# Patient Record
Sex: FEMALE | Race: WHITE | Employment: FULL TIME | URBAN - METROPOLITAN AREA
[De-identification: names, ages, dates, MRNs, and addresses within clinical notes are randomized per-mention and may not be internally consistent; named-entity substitution may affect disease eponyms.]

---

## 2022-04-22 ENCOUNTER — HOSPITAL ENCOUNTER (OUTPATIENT)
Dept: GENERAL RADIOLOGY | Age: 47
Discharge: HOME | End: 2022-04-22
Attending: FAMILY MEDICINE
Payer: COMMERCIAL

## 2022-04-22 ENCOUNTER — TRANSCRIBE ORDER (OUTPATIENT)
Dept: GENERAL RADIOLOGY | Age: 47
End: 2022-04-22

## 2022-04-22 DIAGNOSIS — M54.59 OTHER LOW BACK PAIN: ICD-10-CM

## 2022-04-22 DIAGNOSIS — M54.59 OTHER LOW BACK PAIN: Primary | ICD-10-CM

## 2022-04-22 PROCEDURE — 72100 X-RAY EXAM L-S SPINE 2/3 VWS: CPT

## 2022-04-22 PROCEDURE — 72200 X-RAY EXAM SI JOINTS: CPT

## 2022-09-07 ENCOUNTER — TRANSCRIBE ORDER (OUTPATIENT)
Dept: SCHEDULING | Age: 47
End: 2022-09-07

## 2022-09-07 DIAGNOSIS — M54.59 OTHER LOW BACK PAIN: Primary | ICD-10-CM

## 2022-09-07 DIAGNOSIS — M51.36 LUMBAR DEGENERATIVE DISC DISEASE: ICD-10-CM

## 2023-04-21 DIAGNOSIS — M54.59 OTHER LOW BACK PAIN: Primary | ICD-10-CM

## 2023-04-21 DIAGNOSIS — M51.36 LUMBAR DEGENERATIVE DISC DISEASE: ICD-10-CM

## 2023-04-30 RX ORDER — ACETAMINOPHEN AND CODEINE PHOSPHATE 300; 30 MG/1; MG/1
2 TABLET ORAL EVERY 4 HOURS PRN
COMMUNITY
Start: 2014-06-17

## 2024-02-12 ENCOUNTER — HOSPITAL ENCOUNTER (OUTPATIENT)
Facility: HOSPITAL | Age: 49
Discharge: HOME | End: 2024-02-15
Attending: FAMILY MEDICINE

## 2024-02-12 DIAGNOSIS — Z13.6 ENCOUNTER FOR SCREENING FOR CARDIOVASCULAR DISORDERS: ICD-10-CM

## 2024-02-12 PROCEDURE — 75571 CT HRT W/O DYE W/CA TEST: CPT

## 2024-05-30 ENCOUNTER — HOSPITAL ENCOUNTER (OUTPATIENT)
Facility: HOSPITAL | Age: 49
Discharge: HOME | End: 2024-05-30
Attending: FAMILY MEDICINE
Payer: COMMERCIAL

## 2024-05-30 DIAGNOSIS — G44.52 NEW DAILY PERSISTENT HEADACHE (NDPH): ICD-10-CM

## 2024-05-30 PROCEDURE — 70553 MRI BRAIN STEM W/O & W/DYE: CPT

## 2024-05-30 PROCEDURE — A9579 GAD-BASE MR CONTRAST NOS,1ML: HCPCS | Performed by: RADIOLOGY

## 2024-05-30 PROCEDURE — 6360000004 HC RX CONTRAST MEDICATION: Performed by: RADIOLOGY

## 2024-05-30 RX ADMIN — GADOTERIDOL 13 ML: 279.3 INJECTION, SOLUTION INTRAVENOUS at 20:59

## 2024-06-24 ENCOUNTER — APPOINTMENT (OUTPATIENT)
Facility: HOSPITAL | Age: 49
End: 2024-06-24
Payer: COMMERCIAL

## 2024-06-24 ENCOUNTER — HOSPITAL ENCOUNTER (INPATIENT)
Facility: HOSPITAL | Age: 49
LOS: 4 days | Discharge: HOME | End: 2024-06-28
Attending: STUDENT IN AN ORGANIZED HEALTH CARE EDUCATION/TRAINING PROGRAM | Admitting: INTERNAL MEDICINE
Payer: COMMERCIAL

## 2024-06-24 DIAGNOSIS — N13.2 URETERAL STONE WITH HYDRONEPHROSIS: Primary | ICD-10-CM

## 2024-06-24 DIAGNOSIS — E86.0 DEHYDRATION: ICD-10-CM

## 2024-06-24 DIAGNOSIS — R10.9 INTRACTABLE ABDOMINAL PAIN: ICD-10-CM

## 2024-06-24 DIAGNOSIS — D72.829 LEUKOCYTOSIS, UNSPECIFIED TYPE: ICD-10-CM

## 2024-06-24 DIAGNOSIS — E87.6 HYPOKALEMIA: ICD-10-CM

## 2024-06-24 PROBLEM — N20.1 LEFT URETERAL STONE: Status: ACTIVE | Noted: 2024-06-24

## 2024-06-24 LAB
ALBUMIN SERPL-MCNC: 4 G/DL (ref 3.5–5)
ALBUMIN/GLOB SERPL: 1.1 (ref 1.1–2.2)
ALP SERPL-CCNC: 53 U/L (ref 45–117)
ALT SERPL-CCNC: 19 U/L (ref 12–78)
ANION GAP SERPL CALC-SCNC: 16 MMOL/L (ref 5–15)
AST SERPL-CCNC: 19 U/L (ref 15–37)
BASOPHILS # BLD: 0 K/UL (ref 0–0.1)
BASOPHILS NFR BLD: 0 % (ref 0–1)
BILIRUB SERPL-MCNC: 0.8 MG/DL (ref 0.2–1)
BUN SERPL-MCNC: 14 MG/DL (ref 6–20)
BUN/CREAT SERPL: 13 (ref 12–20)
CALCIUM SERPL-MCNC: 9.3 MG/DL (ref 8.5–10.1)
CHLORIDE SERPL-SCNC: 104 MMOL/L (ref 97–108)
CO2 SERPL-SCNC: 20 MMOL/L (ref 21–32)
CREAT SERPL-MCNC: 1.08 MG/DL (ref 0.55–1.02)
DIFFERENTIAL METHOD BLD: ABNORMAL
EOSINOPHIL # BLD: 0 K/UL (ref 0–0.4)
EOSINOPHIL NFR BLD: 0 % (ref 0–7)
ERYTHROCYTE [DISTWIDTH] IN BLOOD BY AUTOMATED COUNT: 12.7 % (ref 11.5–14.5)
GLOBULIN SER CALC-MCNC: 3.5 G/DL (ref 2–4)
GLUCOSE SERPL-MCNC: 128 MG/DL (ref 65–100)
HCT VFR BLD AUTO: 36.8 % (ref 35–47)
HGB BLD-MCNC: 12.5 G/DL (ref 11.5–16)
IMM GRANULOCYTES # BLD AUTO: 0 K/UL (ref 0–0.04)
IMM GRANULOCYTES NFR BLD AUTO: 0 % (ref 0–0.5)
LIPASE SERPL-CCNC: 32 U/L (ref 13–75)
LYMPHOCYTES # BLD: 0.7 K/UL (ref 0.8–3.5)
LYMPHOCYTES NFR BLD: 5 % (ref 12–49)
MAGNESIUM SERPL-MCNC: 1.6 MG/DL (ref 1.6–2.4)
MCH RBC QN AUTO: 31.6 PG (ref 26–34)
MCHC RBC AUTO-ENTMCNC: 34 G/DL (ref 30–36.5)
MCV RBC AUTO: 93.2 FL (ref 80–99)
MONOCYTES # BLD: 0.4 K/UL (ref 0–1)
MONOCYTES NFR BLD: 3 % (ref 5–13)
NEUTS SEG # BLD: 12.4 K/UL (ref 1.8–8)
NEUTS SEG NFR BLD: 92 % (ref 32–75)
NRBC # BLD: 0 K/UL (ref 0–0.01)
NRBC BLD-RTO: 0 PER 100 WBC
PLATELET # BLD AUTO: 248 K/UL (ref 150–400)
PMV BLD AUTO: 9.9 FL (ref 8.9–12.9)
POTASSIUM SERPL-SCNC: 2.8 MMOL/L (ref 3.5–5.1)
PROT SERPL-MCNC: 7.5 G/DL (ref 6.4–8.2)
RBC # BLD AUTO: 3.95 M/UL (ref 3.8–5.2)
RBC MORPH BLD: ABNORMAL
SODIUM SERPL-SCNC: 140 MMOL/L (ref 136–145)
WBC # BLD AUTO: 13.5 K/UL (ref 3.6–11)

## 2024-06-24 PROCEDURE — 85025 COMPLETE CBC W/AUTO DIFF WBC: CPT

## 2024-06-24 PROCEDURE — 2580000003 HC RX 258: Performed by: STUDENT IN AN ORGANIZED HEALTH CARE EDUCATION/TRAINING PROGRAM

## 2024-06-24 PROCEDURE — 6360000004 HC RX CONTRAST MEDICATION: Performed by: STUDENT IN AN ORGANIZED HEALTH CARE EDUCATION/TRAINING PROGRAM

## 2024-06-24 PROCEDURE — 36415 COLL VENOUS BLD VENIPUNCTURE: CPT

## 2024-06-24 PROCEDURE — 6370000000 HC RX 637 (ALT 250 FOR IP): Performed by: STUDENT IN AN ORGANIZED HEALTH CARE EDUCATION/TRAINING PROGRAM

## 2024-06-24 PROCEDURE — 81001 URINALYSIS AUTO W/SCOPE: CPT

## 2024-06-24 PROCEDURE — 83735 ASSAY OF MAGNESIUM: CPT

## 2024-06-24 PROCEDURE — 6360000002 HC RX W HCPCS: Performed by: STUDENT IN AN ORGANIZED HEALTH CARE EDUCATION/TRAINING PROGRAM

## 2024-06-24 PROCEDURE — 80053 COMPREHEN METABOLIC PANEL: CPT

## 2024-06-24 PROCEDURE — 2500000003 HC RX 250 WO HCPCS: Performed by: STUDENT IN AN ORGANIZED HEALTH CARE EDUCATION/TRAINING PROGRAM

## 2024-06-24 PROCEDURE — 83690 ASSAY OF LIPASE: CPT

## 2024-06-24 PROCEDURE — 96376 TX/PRO/DX INJ SAME DRUG ADON: CPT

## 2024-06-24 PROCEDURE — 99285 EMERGENCY DEPT VISIT HI MDM: CPT

## 2024-06-24 PROCEDURE — 96361 HYDRATE IV INFUSION ADD-ON: CPT

## 2024-06-24 PROCEDURE — 96375 TX/PRO/DX INJ NEW DRUG ADDON: CPT

## 2024-06-24 PROCEDURE — 1200000000 HC SEMI PRIVATE

## 2024-06-24 PROCEDURE — 74177 CT ABD & PELVIS W/CONTRAST: CPT

## 2024-06-24 PROCEDURE — 96374 THER/PROPH/DIAG INJ IV PUSH: CPT

## 2024-06-24 RX ORDER — HYDROMORPHONE HYDROCHLORIDE 1 MG/ML
1 INJECTION, SOLUTION INTRAMUSCULAR; INTRAVENOUS; SUBCUTANEOUS
Status: COMPLETED | OUTPATIENT
Start: 2024-06-24 | End: 2024-06-24

## 2024-06-24 RX ORDER — 0.9 % SODIUM CHLORIDE 0.9 %
1000 INTRAVENOUS SOLUTION INTRAVENOUS ONCE
Status: COMPLETED | OUTPATIENT
Start: 2024-06-24 | End: 2024-06-24

## 2024-06-24 RX ORDER — MORPHINE SULFATE 2 MG/ML
2 INJECTION, SOLUTION INTRAMUSCULAR; INTRAVENOUS
Status: DISCONTINUED | OUTPATIENT
Start: 2024-06-24 | End: 2024-06-28 | Stop reason: HOSPADM

## 2024-06-24 RX ORDER — ACETAMINOPHEN 650 MG/1
650 SUPPOSITORY RECTAL EVERY 6 HOURS PRN
Status: DISCONTINUED | OUTPATIENT
Start: 2024-06-24 | End: 2024-06-28 | Stop reason: HOSPADM

## 2024-06-24 RX ORDER — KETOROLAC TROMETHAMINE 30 MG/ML
15 INJECTION, SOLUTION INTRAMUSCULAR; INTRAVENOUS EVERY 8 HOURS PRN
Status: DISCONTINUED | OUTPATIENT
Start: 2024-06-24 | End: 2024-06-28 | Stop reason: HOSPADM

## 2024-06-24 RX ORDER — ACETAMINOPHEN 325 MG/1
650 TABLET ORAL EVERY 6 HOURS PRN
Status: DISCONTINUED | OUTPATIENT
Start: 2024-06-24 | End: 2024-06-28 | Stop reason: HOSPADM

## 2024-06-24 RX ORDER — MAGNESIUM SULFATE 1 G/100ML
1000 INJECTION INTRAVENOUS ONCE
Status: COMPLETED | OUTPATIENT
Start: 2024-06-24 | End: 2024-06-25

## 2024-06-24 RX ORDER — TAMSULOSIN HYDROCHLORIDE 0.4 MG/1
0.4 CAPSULE ORAL DAILY
Status: DISCONTINUED | OUTPATIENT
Start: 2024-06-24 | End: 2024-06-28 | Stop reason: HOSPADM

## 2024-06-24 RX ORDER — HYDROMORPHONE HYDROCHLORIDE 1 MG/ML
1 INJECTION, SOLUTION INTRAMUSCULAR; INTRAVENOUS; SUBCUTANEOUS
Status: COMPLETED | OUTPATIENT
Start: 2024-06-24 | End: 2024-06-26

## 2024-06-24 RX ORDER — POLYETHYLENE GLYCOL 3350 17 G/17G
17 POWDER, FOR SOLUTION ORAL DAILY PRN
Status: DISCONTINUED | OUTPATIENT
Start: 2024-06-24 | End: 2024-06-28 | Stop reason: HOSPADM

## 2024-06-24 RX ORDER — POTASSIUM CHLORIDE 750 MG/1
40 TABLET, FILM COATED, EXTENDED RELEASE ORAL PRN
Status: DISCONTINUED | OUTPATIENT
Start: 2024-06-24 | End: 2024-06-25

## 2024-06-24 RX ORDER — SODIUM CHLORIDE 9 MG/ML
INJECTION, SOLUTION INTRAVENOUS PRN
Status: DISCONTINUED | OUTPATIENT
Start: 2024-06-24 | End: 2024-06-28 | Stop reason: HOSPADM

## 2024-06-24 RX ORDER — SODIUM CHLORIDE 0.9 % (FLUSH) 0.9 %
5-40 SYRINGE (ML) INJECTION EVERY 12 HOURS SCHEDULED
Status: DISCONTINUED | OUTPATIENT
Start: 2024-06-24 | End: 2024-06-28 | Stop reason: HOSPADM

## 2024-06-24 RX ORDER — HYDROMORPHONE HYDROCHLORIDE 1 MG/ML
1 INJECTION, SOLUTION INTRAMUSCULAR; INTRAVENOUS; SUBCUTANEOUS
Status: DISCONTINUED | OUTPATIENT
Start: 2024-06-24 | End: 2024-06-24 | Stop reason: SDUPTHER

## 2024-06-24 RX ORDER — MAGNESIUM SULFATE IN WATER 40 MG/ML
2000 INJECTION, SOLUTION INTRAVENOUS PRN
Status: DISCONTINUED | OUTPATIENT
Start: 2024-06-24 | End: 2024-06-25

## 2024-06-24 RX ORDER — SODIUM CHLORIDE 0.9 % (FLUSH) 0.9 %
5-40 SYRINGE (ML) INJECTION PRN
Status: DISCONTINUED | OUTPATIENT
Start: 2024-06-24 | End: 2024-06-28 | Stop reason: HOSPADM

## 2024-06-24 RX ORDER — ONDANSETRON 4 MG/1
4 TABLET, ORALLY DISINTEGRATING ORAL EVERY 8 HOURS PRN
Status: DISCONTINUED | OUTPATIENT
Start: 2024-06-24 | End: 2024-06-28 | Stop reason: HOSPADM

## 2024-06-24 RX ORDER — ONDANSETRON 2 MG/ML
4 INJECTION INTRAMUSCULAR; INTRAVENOUS
Status: COMPLETED | OUTPATIENT
Start: 2024-06-24 | End: 2024-06-24

## 2024-06-24 RX ORDER — ONDANSETRON 2 MG/ML
4 INJECTION INTRAMUSCULAR; INTRAVENOUS EVERY 6 HOURS PRN
Status: DISCONTINUED | OUTPATIENT
Start: 2024-06-24 | End: 2024-06-28 | Stop reason: HOSPADM

## 2024-06-24 RX ORDER — KETOROLAC TROMETHAMINE 30 MG/ML
15 INJECTION, SOLUTION INTRAMUSCULAR; INTRAVENOUS
Status: COMPLETED | OUTPATIENT
Start: 2024-06-24 | End: 2024-06-24

## 2024-06-24 RX ORDER — POTASSIUM CHLORIDE 750 MG/1
40 TABLET, FILM COATED, EXTENDED RELEASE ORAL ONCE
Status: COMPLETED | OUTPATIENT
Start: 2024-06-24 | End: 2024-06-24

## 2024-06-24 RX ORDER — NALOXONE HYDROCHLORIDE 0.4 MG/ML
0.4 INJECTION, SOLUTION INTRAMUSCULAR; INTRAVENOUS; SUBCUTANEOUS PRN
Status: DISCONTINUED | OUTPATIENT
Start: 2024-06-24 | End: 2024-06-28 | Stop reason: HOSPADM

## 2024-06-24 RX ORDER — OXYCODONE HYDROCHLORIDE 5 MG/1
5 TABLET ORAL EVERY 4 HOURS PRN
Status: DISCONTINUED | OUTPATIENT
Start: 2024-06-24 | End: 2024-06-28 | Stop reason: HOSPADM

## 2024-06-24 RX ORDER — POTASSIUM CHLORIDE 7.45 MG/ML
10 INJECTION INTRAVENOUS PRN
Status: DISCONTINUED | OUTPATIENT
Start: 2024-06-24 | End: 2024-06-25

## 2024-06-24 RX ORDER — SODIUM CHLORIDE 9 MG/ML
INJECTION, SOLUTION INTRAVENOUS CONTINUOUS
Status: DISPENSED | OUTPATIENT
Start: 2024-06-24 | End: 2024-06-26

## 2024-06-24 RX ORDER — ONDANSETRON 2 MG/ML
4 INJECTION INTRAMUSCULAR; INTRAVENOUS EVERY 6 HOURS PRN
Status: DISCONTINUED | OUTPATIENT
Start: 2024-06-24 | End: 2024-06-24 | Stop reason: SDUPTHER

## 2024-06-24 RX ADMIN — HYDROMORPHONE HYDROCHLORIDE 1 MG: 1 INJECTION, SOLUTION INTRAMUSCULAR; INTRAVENOUS; SUBCUTANEOUS at 23:15

## 2024-06-24 RX ADMIN — POTASSIUM CHLORIDE 40 MEQ: 750 TABLET, FILM COATED, EXTENDED RELEASE ORAL at 23:15

## 2024-06-24 RX ADMIN — WATER 1000 MG: 1 INJECTION INTRAMUSCULAR; INTRAVENOUS; SUBCUTANEOUS at 23:14

## 2024-06-24 RX ADMIN — POTASSIUM CHLORIDE 40 MEQ: 750 TABLET, FILM COATED, EXTENDED RELEASE ORAL at 15:54

## 2024-06-24 RX ADMIN — MAGNESIUM SULFATE HEPTAHYDRATE 1000 MG: 1 INJECTION, SOLUTION INTRAVENOUS at 23:14

## 2024-06-24 RX ADMIN — SODIUM CHLORIDE 1000 ML: 9 INJECTION, SOLUTION INTRAVENOUS at 13:55

## 2024-06-24 RX ADMIN — ONDANSETRON 4 MG: 2 INJECTION INTRAMUSCULAR; INTRAVENOUS at 13:57

## 2024-06-24 RX ADMIN — KETOROLAC TROMETHAMINE 15 MG: 30 INJECTION, SOLUTION INTRAMUSCULAR at 13:57

## 2024-06-24 RX ADMIN — SODIUM CHLORIDE, PRESERVATIVE FREE 10 ML: 5 INJECTION INTRAVENOUS at 21:00

## 2024-06-24 RX ADMIN — HYDROMORPHONE HYDROCHLORIDE 1 MG: 1 INJECTION, SOLUTION INTRAMUSCULAR; INTRAVENOUS; SUBCUTANEOUS at 15:54

## 2024-06-24 RX ADMIN — IOPAMIDOL 100 ML: 755 INJECTION, SOLUTION INTRAVENOUS at 14:44

## 2024-06-24 RX ADMIN — HYDROMORPHONE HYDROCHLORIDE 1 MG: 1 INJECTION, SOLUTION INTRAMUSCULAR; INTRAVENOUS; SUBCUTANEOUS at 13:57

## 2024-06-24 RX ADMIN — SODIUM CHLORIDE 5 ML/HR: 9 INJECTION, SOLUTION INTRAVENOUS at 23:12

## 2024-06-24 ASSESSMENT — PAIN SCALES - GENERAL
PAINLEVEL_OUTOF10: 9
PAINLEVEL_OUTOF10: 10
PAINLEVEL_OUTOF10: 3
PAINLEVEL_OUTOF10: 6
PAINLEVEL_OUTOF10: 7
PAINLEVEL_OUTOF10: 2

## 2024-06-24 ASSESSMENT — PAIN DESCRIPTION - DESCRIPTORS
DESCRIPTORS: SHOOTING
DESCRIPTORS: ACHING

## 2024-06-24 ASSESSMENT — PAIN DESCRIPTION - LOCATION
LOCATION: ABDOMEN

## 2024-06-24 ASSESSMENT — PAIN DESCRIPTION - ORIENTATION
ORIENTATION: LEFT;LOWER
ORIENTATION: LOWER

## 2024-06-24 NOTE — ED NOTES
TRANSFER - OUT REPORT:    Verbal report given to Soledad RN on Candace Roberts  being transferred to [facility]  for routine progression of patient care       Report consisted of patient's Situation, Background, Assessment and   Recommendations(SBAR).     Information from the following report(s) ED SBAR was reviewed with the receiving nurse.    [place] Fall Assessment:    Presents to emergency department  because of falls (Syncope, seizure, or loss of consciousness): No  Age > 70: No  Altered Mental Status, Intoxication with alcohol or substance confusion (Disorientation, impaired judgment, poor safety awaremess, or inability to follow instructions): No  Impaired Mobility: Ambulates or transfers with assistive devices or assistance; Unable to ambulate or transer.: No  Nursing Judgement: Yes          Lines:   Peripheral IV 06/24/24 Posterior;Right Hand (Active)        Opportunity for questions and clarification was provided.      Patient transported with:  DYLAN

## 2024-06-24 NOTE — ED TRIAGE NOTES
ED triage note: arrives accompanied. Reports n/v/d started last night. States this morning went to work and started feeling shooting pain in lower abdomen worse on left side and vomiting.

## 2024-06-24 NOTE — ED PROVIDER NOTES
EMERGENCY DEPARTMENT PHYSICIAN NOTE     Patient: Candace Roberts     Time of Service: 6/24/2024  1:10 PM     Chief complaint:   Chief Complaint   Patient presents with    Abdominal Pain        HISTORY:  Patient is a 48 y.o. female who presents to the emergency department with complaints of severe nausea vomiting and left-sided abdominal pain that started last night.  Patient in acute distress and very ill-appearing.  Diaphoretic.  Patient reports history of kidney stones when she was younger but nothing like this pain.  IV pain medication ordered labs CT scan and urinalysis.  Patient afebrile at this time, denies dysuria.      Past Medical History:   Diagnosis Date    Cancer (HCC)     melanoma left lower leg Dx 5/14        Past Surgical History:   Procedure Laterality Date    HEENT      wisdom teeth, lasix        No family history on file.     Social History     Socioeconomic History    Marital status:    Tobacco Use    Smoking status: Never   Substance and Sexual Activity    Alcohol use: Yes        Current Medications: Reviewed in chart.    Allergies:   Allergies   Allergen Reactions    Sulfa Antibiotics Rash          REVIEW OF SYSTEMS: See HPI for pertinent positives and negatives.      PHYSICAL EXAM:  /74   Pulse 75   Temp 97 °F (36.1 °C) (Tympanic)   Resp 22   Ht 1.676 m (5' 6\")   Wt 63.5 kg (140 lb)   LMP 06/24/2024   SpO2 100%   BMI 22.60 kg/m²    Physical Exam  Vitals and nursing note reviewed.   Constitutional:       General: She is in acute distress.      Appearance: Normal appearance. She is ill-appearing.   HENT:      Head: Normocephalic and atraumatic.      Right Ear: External ear normal.      Left Ear: External ear normal.      Nose: Nose normal.      Mouth/Throat:      Mouth: Mucous membranes are moist.   Eyes:      Extraocular Movements: Extraocular movements intact.      Conjunctiva/sclera: Conjunctivae normal.   Cardiovascular:      Rate and Rhythm: Normal rate and regular

## 2024-06-24 NOTE — PROGRESS NOTES
Spiritual Care Assessment/Progress Note  [facility]    Name: Candace Roberts MRN: [id number]    Age: 48 y.o.     Sex: female   Language: English     Date: 6/24/2024            Total Time Calculated: 10 min              Spiritual Assessment begun in [facility]  Service Provided For: Patient  Referral/Consult From: Rounding  Encounter Overview/Reason: Initial Encounter    Spiritual beliefs:      [x] Involved in a marco antonio tradition/spiritual practice: Sabianist     [x] Supported by a marco antonio community: Yazdanism     [] Claims no spiritual orientation:      [] Seeking spiritual identity:           [] Adheres to an individual form of spirituality:      [] Not able to assess:                Identified resources for coping and support system:   Support System: Children, Spouse       [] Prayer                  [] Devotional reading               [] Music                  [] Guided Imagery     [] Pet visits                                        [] Other: (COMMENT)     Specific area/focus of visit   Encounter:    Crisis:    Spiritual/Emotional needs:    Ritual, Rites and Sacraments:    Grief, Loss, and Adjustments:    Ethics/Mediation:    Behavioral Health:    Palliative Care:    Advance Care Planning:      I visited Candace Roberts for an initial spiritual health assessment.   Her daughter was with her. They were both waiting to to hear what her doctor would say and wether she would be able to return home.   No immediate spiritual health needs.   Chaplain Francisco, Joanie, MS, BCC

## 2024-06-25 LAB
ANION GAP SERPL CALC-SCNC: 2 MMOL/L (ref 5–15)
APPEARANCE UR: CLEAR
BACTERIA URNS QL MICRO: NEGATIVE /HPF
BASOPHILS # BLD: 0 K/UL (ref 0–0.1)
BASOPHILS NFR BLD: 0 % (ref 0–1)
BILIRUB UR QL: NEGATIVE
BUN SERPL-MCNC: 14 MG/DL (ref 6–20)
BUN/CREAT SERPL: 11 (ref 12–20)
CALCIUM SERPL-MCNC: 8.2 MG/DL (ref 8.5–10.1)
CHLORIDE SERPL-SCNC: 113 MMOL/L (ref 97–108)
CO2 SERPL-SCNC: 23 MMOL/L (ref 21–32)
COLOR UR: ABNORMAL
CREAT SERPL-MCNC: 1.27 MG/DL (ref 0.55–1.02)
DIFFERENTIAL METHOD BLD: ABNORMAL
EOSINOPHIL # BLD: 0.1 K/UL (ref 0–0.4)
EOSINOPHIL NFR BLD: 1 % (ref 0–7)
EPITH CASTS URNS QL MICRO: ABNORMAL /LPF
ERYTHROCYTE [DISTWIDTH] IN BLOOD BY AUTOMATED COUNT: 13.3 % (ref 11.5–14.5)
GLUCOSE SERPL-MCNC: 88 MG/DL (ref 65–100)
GLUCOSE UR STRIP.AUTO-MCNC: NEGATIVE MG/DL
HCT VFR BLD AUTO: 37.1 % (ref 35–47)
HGB BLD-MCNC: 12.5 G/DL (ref 11.5–16)
HGB UR QL STRIP: ABNORMAL
IMM GRANULOCYTES # BLD AUTO: 0.1 K/UL (ref 0–0.04)
IMM GRANULOCYTES NFR BLD AUTO: 1 % (ref 0–0.5)
KETONES UR QL STRIP.AUTO: 40 MG/DL
LEUKOCYTE ESTERASE UR QL STRIP.AUTO: NEGATIVE
LYMPHOCYTES # BLD: 1.9 K/UL (ref 0.8–3.5)
LYMPHOCYTES NFR BLD: 14 % (ref 12–49)
MCH RBC QN AUTO: 32.5 PG (ref 26–34)
MCHC RBC AUTO-ENTMCNC: 33.7 G/DL (ref 30–36.5)
MCV RBC AUTO: 96.4 FL (ref 80–99)
MONOCYTES # BLD: 0.9 K/UL (ref 0–1)
MONOCYTES NFR BLD: 7 % (ref 5–13)
MUCOUS THREADS URNS QL MICRO: ABNORMAL /LPF
NEUTS SEG # BLD: 10.6 K/UL (ref 1.8–8)
NEUTS SEG NFR BLD: 77 % (ref 32–75)
NITRITE UR QL STRIP.AUTO: NEGATIVE
NRBC # BLD: 0 K/UL (ref 0–0.01)
NRBC BLD-RTO: 0 PER 100 WBC
PH UR STRIP: 5.5 (ref 5–8)
PLATELET # BLD AUTO: 235 K/UL (ref 150–400)
PMV BLD AUTO: 9.8 FL (ref 8.9–12.9)
POTASSIUM SERPL-SCNC: 4.6 MMOL/L (ref 3.5–5.1)
PROT UR STRIP-MCNC: ABNORMAL MG/DL
RBC # BLD AUTO: 3.85 M/UL (ref 3.8–5.2)
RBC #/AREA URNS HPF: ABNORMAL /HPF (ref 0–5)
SODIUM SERPL-SCNC: 138 MMOL/L (ref 136–145)
SP GR UR REFRACTOMETRY: 1.02 (ref 1–1.03)
SPECIMEN HOLD: NORMAL
UROBILINOGEN UR QL STRIP.AUTO: 0.2 EU/DL (ref 0.2–1)
WBC # BLD AUTO: 13.6 K/UL (ref 3.6–11)
WBC URNS QL MICRO: ABNORMAL /HPF (ref 0–4)

## 2024-06-25 PROCEDURE — 6370000000 HC RX 637 (ALT 250 FOR IP): Performed by: STUDENT IN AN ORGANIZED HEALTH CARE EDUCATION/TRAINING PROGRAM

## 2024-06-25 PROCEDURE — 85025 COMPLETE CBC W/AUTO DIFF WBC: CPT

## 2024-06-25 PROCEDURE — 6360000002 HC RX W HCPCS: Performed by: STUDENT IN AN ORGANIZED HEALTH CARE EDUCATION/TRAINING PROGRAM

## 2024-06-25 PROCEDURE — 1200000000 HC SEMI PRIVATE

## 2024-06-25 PROCEDURE — 2580000003 HC RX 258: Performed by: STUDENT IN AN ORGANIZED HEALTH CARE EDUCATION/TRAINING PROGRAM

## 2024-06-25 PROCEDURE — 80048 BASIC METABOLIC PNL TOTAL CA: CPT

## 2024-06-25 PROCEDURE — 36415 COLL VENOUS BLD VENIPUNCTURE: CPT

## 2024-06-25 RX ADMIN — HYDROMORPHONE HYDROCHLORIDE 1 MG: 1 INJECTION, SOLUTION INTRAMUSCULAR; INTRAVENOUS; SUBCUTANEOUS at 21:16

## 2024-06-25 RX ADMIN — ONDANSETRON 4 MG: 2 INJECTION INTRAMUSCULAR; INTRAVENOUS at 13:26

## 2024-06-25 RX ADMIN — TAMSULOSIN HYDROCHLORIDE 0.4 MG: 0.4 CAPSULE ORAL at 08:03

## 2024-06-25 RX ADMIN — MORPHINE SULFATE 2 MG: 2 INJECTION, SOLUTION INTRAMUSCULAR; INTRAVENOUS at 11:20

## 2024-06-25 RX ADMIN — TAMSULOSIN HYDROCHLORIDE 0.4 MG: 0.4 CAPSULE ORAL at 01:52

## 2024-06-25 RX ADMIN — WATER 1000 MG: 1 INJECTION INTRAMUSCULAR; INTRAVENOUS; SUBCUTANEOUS at 22:44

## 2024-06-25 RX ADMIN — SODIUM CHLORIDE: 9 INJECTION, SOLUTION INTRAVENOUS at 01:51

## 2024-06-25 RX ADMIN — SODIUM CHLORIDE: 9 INJECTION, SOLUTION INTRAVENOUS at 09:48

## 2024-06-25 RX ADMIN — SODIUM CHLORIDE, PRESERVATIVE FREE 10 ML: 5 INJECTION INTRAVENOUS at 21:15

## 2024-06-25 RX ADMIN — KETOROLAC TROMETHAMINE 15 MG: 30 INJECTION, SOLUTION INTRAMUSCULAR at 13:25

## 2024-06-25 RX ADMIN — KETOROLAC TROMETHAMINE 15 MG: 30 INJECTION, SOLUTION INTRAMUSCULAR at 21:14

## 2024-06-25 ASSESSMENT — PAIN SCALES - GENERAL
PAINLEVEL_OUTOF10: 5
PAINLEVEL_OUTOF10: 7
PAINLEVEL_OUTOF10: 2
PAINLEVEL_OUTOF10: 7

## 2024-06-25 ASSESSMENT — PAIN DESCRIPTION - LOCATION
LOCATION: ABDOMEN
LOCATION: FLANK;ABDOMEN
LOCATION: ABDOMEN

## 2024-06-25 ASSESSMENT — PAIN DESCRIPTION - ORIENTATION
ORIENTATION: LEFT
ORIENTATION: LEFT
ORIENTATION: LEFT;LOWER

## 2024-06-25 ASSESSMENT — PAIN DESCRIPTION - DESCRIPTORS: DESCRIPTORS: ACHING

## 2024-06-25 NOTE — PLAN OF CARE
Problem: Discharge Planning  Goal: Discharge to home or other facility with appropriate resources  6/25/2024 1531 by Daria Osei, RN  Outcome: Progressing  6/25/2024 0225 by Wen Chi, RN  Outcome: Progressing     Problem: Pain  Goal: Verbalizes/displays adequate comfort level or baseline comfort level  6/25/2024 1531 by Daria Osei RN  Outcome: Progressing  6/25/2024 0225 by Wen Chi RN  Outcome: Progressing

## 2024-06-25 NOTE — H&P
History & Physical    Primary Care Provider: Twyla Nova MD  Source of Information: Patient and chart review    History of Presenting Illness:   Candace Roberts is a 48 y.o. female with past medical history of left lower extremity melanoma who presented to [facility] emergency department with complaints of nausea, vomiting left middle quadrant and flank pain.  Had been in her usual state of health until about 2 days ago when she experienced symptoms.  She admits to previous episode of kidney stone with 2 mm stone which she passed.  She reports marked improvement at time of my evaluation and denies any symptoms.  The patient denies any fever, chills, chest or abdominal pain, nausea, vomiting, cough, congestion, recent illness, palpitations, or dysuria.    Remarkable vitals on ER Presentation: vss  Labs Remarkable for: WBC 13.5, potassium 2.8  ER Images: CT abdomen pelvis: Mild left-sided hydronephrosis due to 5 mm stone distal left ureter 2. Bilateral nonobstructing renal stones   ER Rx: dilaudid, toradol, zofran, -40meq, 1l ns bolus     Review of Systems:  Pertinent items are noted in the History of Present Illness.     Past Medical History:   Diagnosis Date    Cancer (HCC)     melanoma left lower leg Dx 5/14      Past Surgical History:   Procedure Laterality Date    HEENT      wisdom teeth, lasix     Prior to Admission medications    Medication Sig Start Date End Date Taking? Authorizing Provider   SPIRONOLACTONE PO Take by mouth   Yes Provider, MD Tanna   acetaminophen-codeine (TYLENOL #3) 300-30 MG per tablet Take 2 tablets by mouth every 4 hours as needed. Max Daily Amount: 12 tablets 6/17/14   Automatic Reconciliation, Ar     Allergies   Allergen Reactions    Sulfa Antibiotics Rash      No family history on file.     SOCIAL HISTORY:  Patient resides:  Independently x   Assisted Living    SNF    With family care       Smoking history:   None x   Former    Chronic      Alcohol history:   None x  U/L    AST 19 15 - 37 U/L    Alk Phosphatase 53 45 - 117 U/L    Total Protein 7.5 6.4 - 8.2 g/dL    Albumin 4.0 3.5 - 5.0 g/dL    Globulin 3.5 2.0 - 4.0 g/dL    Albumin/Globulin Ratio 1.1 1.1 - 2.2     Lipase    Collection Time: 06/24/24  1:50 PM   Result Value Ref Range    Lipase 32 13 - 75 U/L   Magnesium    Collection Time: 06/24/24  1:50 PM   Result Value Ref Range    Magnesium 1.6 1.6 - 2.4 mg/dL   CBC with Auto Differential    Collection Time: 06/24/24  2:21 PM   Result Value Ref Range    WBC 13.5 (H) 3.6 - 11.0 K/uL    RBC 3.95 3.80 - 5.20 M/uL    Hemoglobin 12.5 11.5 - 16.0 g/dL    Hematocrit 36.8 35.0 - 47.0 %    MCV 93.2 80.0 - 99.0 FL    MCH 31.6 26.0 - 34.0 PG    MCHC 34.0 30.0 - 36.5 g/dL    RDW 12.7 11.5 - 14.5 %    Platelets 248 150 - 400 K/uL    MPV 9.9 8.9 - 12.9 FL    Nucleated RBCs 0.0 0  WBC    nRBC 0.00 0.00 - 0.01 K/uL    Neutrophils % 92 (H) 32 - 75 %    Lymphocytes % 5 (L) 12 - 49 %    Monocytes % 3 (L) 5 - 13 %    Eosinophils % 0 0 - 7 %    Basophils % 0 0 - 1 %    Immature Granulocytes % 0 0.0 - 0.5 %    Neutrophils Absolute 12.4 (H) 1.8 - 8.0 K/UL    Lymphocytes Absolute 0.7 (L) 0.8 - 3.5 K/UL    Monocytes Absolute 0.4 0.0 - 1.0 K/UL    Eosinophils Absolute 0.0 0.0 - 0.4 K/UL    Basophils Absolute 0.0 0.0 - 0.1 K/UL    Immature Granulocytes Absolute 0.0 0.00 - 0.04 K/UL    Differential Type SMEAR SCANNED      RBC Comment NORMOCYTIC, NORMOCHROMIC           Imaging:     Assessment:     Candace Roberts is a 48 y.o. female with past medical history of left lower extremity melanoma who is admitted for nephrolithiasis.       Plan:       Bilateral Nephrolithiasis  Mild left-sided hydronephrosis due to 5 mm stone distal left ureter   Bilateral nonobstructing renal stones   -renal fx stable  -start flomax  -morphine toradol, darryl prn  -mivf  -urology consult in a.m.    Leukocytosis  -likely form # above  -rocephin pending urinalysis and urine culture    Hypokalemia  -replete po and ensure

## 2024-06-25 NOTE — ACP (ADVANCE CARE PLANNING)
Advance Care Planning     Advance Care Planning Inpatient Note  Spiritual [facility] Department    Today's Date: 6/25/2024  Unit: [facility] 5E1 SURGICAL UNIT    Received request from IDT Member.  Upon review of chart and communication with care team, patient's decision making abilities are not in question.. Patient was present in the room during visit.    Goals of ACP Conversation:  Facilitate a discussion related to patient's goals of care as they align with the patient's values and beliefs.    Health Care Decision Makers:       Primary Decision Maker: Sergey Roberts - Spouse - [phone]  Summary:  Verified Healthcare Decision Maker  Updated Healthcare Decision Maker    Advance Care Planning Documents (Patient Wishes):  Healthcare Power of /Advance Directive Appointment of Health Care Agent     Assessment:  We discussed Candace Roberts current feelings/concerns and spiritual perspectives. Candace Roberts shared that she was tired - hard to sleep with a roommate.She is  with 3 daughters 13, 16, and 20 yo. She works as  and talked about needing her computer to catch up on work. Candace and her family are Adventism and members of [place].     facilitated and advance directive discussion in alignment with patients' values and beliefs. We discussed in detailed [place] law and lyons of agents. At this time, Candace desires to name her spouse, Sergey Roberts, [phone] as her Primary HCPOA. Patient record updated to reflect Candace's decision. Provided ministry of presence, empathic listening; facilitated life review/storytelling; explored spiritual, emotional, and relational needs; and cultivated a relationship of care, compassion, and support.    Interventions:  Provided education on documents for clarity and greater understanding  Discussed and provided education on state decision maker hierarchy  Encouraged ongoing ACP conversation with future decision makers and loved

## 2024-06-25 NOTE — CONSULTS
Requesting Provider: Jose L Casillas MD              Patient: Candace Roberts MRN: [id number]  SSN: [id number]    YOB: 1975  Age: 48 y.o.  Sex: female     Location: [facility]/02       Code Status: Full Code   PCP: Twyla Nova MD  - [phone]   Emergency Contact:  Primary Emergency Contact: Sergey Roberts, Venkat Phone: [phone]   Race/Gnosticist/Language: White (non-) / Voodoo / Speaks English   Payor: Payor: [facility] / Plan: [facility] A [facility] / Product Type: *No Product type* /    Prior Admission Data:         Hospitalized:  Hospital Day: 2 - Admitted 6/24/2024  1:10 PM     CONSULTANTS  IP CONSULT TO UROLOGY  IP CONSULT TO HOSPITALIST  IP CONSULT TO SPIRITUAL SERVICES   ADMISSION DIAGNOSES  [unfilled]      Assessment/Plan:       49 yo F with 5 mm distal left ureteral stone, mild obstruction     - Patient is significantly improved today. No urgent intervention indicated. Okay to eat today with ongoing trial on passage on flomax. Encouraged to push fluids, strain all urine. If patient continues to feel well she can continue trial of passage and follow up on an outpatient basis understanding return parameters vs ongoing observation with re-evaluation again in the AM.     2:40 PM addendum   Patient's pain returned requiring morphine. NPO at midnight for re-eval in the AM with possible intervention.      CC: [unfilled]   HPI: She is a 48 y.o. female with past medical history of melanoma to the left lower leg who presented with chief complaint of left-sided abdominal pain x 1 day.  The pain began abruptly and progressively worsened, severity 10 out of 10, associated nausea and vomiting.  She denies associated fevers, chills, dysuria, hematuria.  CT of the abdomen and pelvis showed a 5 mm distal left ureteral stone with mild upstream hydronephrosis, also noted bilateral nonobstructing renal stones.  Images reviewed.  Urology is consulted for kidney stone.  She reports history of kidney stone in the past  Unremarkable.  KIDNEYS: Mild left-sided hydronephrosis. Slightly delayed nephrogram. 5 mm stone  distal left ureter within the lateral pelvis. There is edema in the soft tissues  anterior to the left kidney. Nonobstructing bilateral renal stones. No  right-sided hydronephrosis. Small cyst left kidney. No further evaluation is  indicated  STOMACH: Unremarkable.  SMALL BOWEL: No dilatation or wall thickening.  COLON: No dilatation or wall thickening.  APPENDIX: Not seen  PERITONEUM: No ascites or pneumoperitoneum.  RETROPERITONEUM: No lymphadenopathy or aortic aneurysm.  REPRODUCTIVE ORGANS: Not enlarged  URINARY BLADDER: Decompressed  BONES: No destructive bone lesion.  ABDOMINAL WALL: No mass or hernia.  ADDITIONAL COMMENTS: N/A    - Impression -  1. Mild left-sided hydronephrosis due to 5 mm stone distal left ureter    2. Bilateral nonobstructing renal stones    Electronically signed by Beulah Cooper    US Result (most recent):  No results found for this or any previous visit from the past 3650 days.    Cultures   [unfilled]     Past History: (Complete 2+/3 areas)     Allergies   Allergen Reactions    Sulfa Antibiotics Rash      Current Facility-Administered Medications   Medication Dose Route Frequency    HYDROmorphone HCl PF (DILAUDID) injection 1 mg  1 mg IntraVENous Q3H PRN    tamsulosin (FLOMAX) capsule 0.4 mg  0.4 mg Oral Daily    cefTRIAXone (ROCEPHIN) 1,000 mg in sterile water 10 mL IV syringe  1,000 mg IntraVENous Q24H    ketorolac (TORADOL) injection 15 mg  15 mg IntraVENous Q8H PRN    oxyCODONE (ROXICODONE) immediate release tablet 5 mg  5 mg Oral Q4H PRN    morphine (PF) injection 2 mg  2 mg IntraVENous Q3H PRN    naloxone (NARCAN) injection 0.4 mg  0.4 mg IntraVENous PRN    sodium chloride flush 0.9 % injection 5-40 mL  5-40 mL IntraVENous 2 times per day    sodium chloride flush 0.9 % injection 5-40 mL  5-40 mL IntraVENous PRN    0.9 % sodium chloride infusion   IntraVENous PRN    potassium

## 2024-06-25 NOTE — PROGRESS NOTES
Pt urinated very minuscule white matter, but didn't feel her pain has relieved. Continue to monitor.

## 2024-06-25 NOTE — FLOWSHEET NOTE
06/24/24 1935   Handoff   Communication Given Shift Handoff   Handoff Given To Wen DEWEY   Handoff Received From Daria COCHRAN RN   Handoff Communication At bedside;Face to Face   Time Handoff Given 1935   End of Shift Check Performed Yes

## 2024-06-25 NOTE — PROGRESS NOTES
Spiritual Care Assessment/Progress Note  [facility]    Name: Candace Roberts MRN: [id number]    Age: 48 y.o.     Sex: female   Language: English     Date: 6/25/2024            Total Time Calculated: 33 min              Spiritual Assessment begun in [place] [address] SURGICAL UNIT  Service Provided For: Patient  Referral/Consult From: Multi-disciplinary team  Encounter Overview/Reason: Advance Care Planning    Spiritual beliefs:      [x] Involved in a marco antonio tradition/spiritual practice: Hinduism     [x] Supported by a marco antonio community: [facility]     [] Claims no spiritual orientation:      [] Seeking spiritual identity:           [] Adheres to an individual form of spirituality:      [] Not able to assess:                Identified resources for coping and support system:   Support System: Spouse, Children       [] Prayer                  [] Devotional reading               [] Music                  [] Guided Imagery     [] Pet visits                                        [x] Other: (Family)     Specific area/focus of visit   Encounter:    Crisis:    Spiritual/Emotional needs: Type: Spiritual Support  Ritual, Rites and Sacraments:    Grief, Loss, and Adjustments:    Ethics/Mediation:    Behavioral Health:    Palliative Care:    Advance Care Planning: Type: ACP conversation, Care Preferences Addressed         Narrative:   Referral source:  initiated visit to Candace Roberts at [facility] in [place] [address] SURGICAL UNIT. I reviewed the medical record prior to this encounter.     Spiritual Assessment:     We discussed Candace Roberts current feelings/concerns and spiritual perspectives. Candace Roberts shared that she was tired - hard to sleep with a roommate.She is  with 3 daughters 13, 16, and 18 yo. She works as  and talked about needing her computer to catch up on work. Candace and her family are Hinduism and members of [facility].     facilitated and advance  directive discussion in alignment with patients' values and beliefs. We discussed in detailed [place] law and ly[place] of agents. At this time, Candace desires to name her spouse, Sergey Roberts, [phone] as her Primary HCPOA. Patient record updated to reflect Candace's decision. Provided ministry of presence, empathic listening; facilitated life review/storytelling; explored spiritual, emotional, and relational needs; and cultivated a relationship of care, compassion, and support.     Outcome: Candace Roberts verbally expressed appreciation for today's visit and support.    Plan of Care:  advised Candace Roberts of continued spiritual support as needed.       Celine Donohue MDiv, Psychiatric   paging Service [phone]

## 2024-06-25 NOTE — PROGRESS NOTES
[facility]  Hospitalist Group                                                                                          Hospitalist Progress Note  KOJO Garcia - NP  Office Phone: [phone]        Date of Service:  2024  NAME:  Candace Roberts  :  1975  MRN:  [id number]       Admission Summary:   Candace Roberts is a 48 y.o. female with past medical history of left lower extremity melanoma who presented to [facility] emergency department with complaints of nausea, vomiting left middle quadrant and flank pain.  Had been in her usual state of health until about 2 days ago when she experienced symptoms.  She admits to previous episode of kidney stone with 2 mm stone which she passed.  She reports marked improvement at time of my evaluation and denies any symptoms.  The patient denies any fever, chills, chest or abdominal pain, nausea, vomiting, cough, congestion, recent illness, palpitations, or dysuria.     Remarkable vitals on ER Presentation: vss  Labs Remarkable for: WBC 13.5, potassium 2.8  ER Images: CT abdomen pelvis: Mild left-sided hydronephrosis due to 5 mm stone distal left ureter 2. Bilateral nonobstructing renal stones   ER Rx: dilaudid, toradol, zofran, -40meq, 1l ns bolus       Interval history / Subjective:      Patient has been encouraged to push oral hydration.  Patient just completed 700 mL of water and now starting to have right-sided abdominal pain.  Pain 5/10.  Appreciate urology. Pt tolerated regular tray.  Continue to monitor for passage of stone.  Cr 1.27  Wbc 13.6    Assessment & Plan:     Bilateral Nephrolithiasis  Mild left-sided hydronephrosis due to 5 mm stone distal left ureter   Bilateral nonobstructing renal stones   -renal fx stable  -start flomax  -morphine toradol, darryl prn  -mivf  -urology evaluated     Leukocytosis  - likely 2/2 above  - UA: poor sample with moderate epithelial cells  - rocephin pending urinalysis and urine culture

## 2024-06-25 NOTE — CARE COORDINATION
Care Management Initial Assessment       RUR: 5% Low   Readmission? No  1st IM letter given? NA  1st  letter given: NA    CM met with patient at bedside to introduce self and explain role. Patient lives with her  and 3 children (19, 16, 13 y.o.) in a 2 story home with 6 steps to enter and a full flight to enter the 2nd floor. Patient is completely independent at baseline. No history of HH or rehab. Patient does not own any DME. Patient's  will provide transport home once medically stable. CM will follow as needed.      06/25/24 1208   Service Assessment   Patient Orientation Alert and Oriented;Person;Place;Situation;Self   Cognition Alert   History Provided By Patient   Primary Caregiver Self   Accompanied By/Relationship    Support Systems Spouse/Significant Other   Patient's Healthcare Decision Maker is: Legal Next of Kin   PCP Verified by CM Yes  (Dr. Twyla Nova)   Last Visit to PCP Within last 6 months  (Last visit in Jan. 2024)   Prior Functional Level Independent in ADLs/IADLs   Current Functional Level Independent in ADLs/IADLs   Can patient return to prior living arrangement Yes   Ability to make needs known: Good   Family able to assist with home care needs: Yes   Would you like for me to discuss the discharge plan with any other family members/significant others, and if so, who? Yes  (Upon patient request)   Social/Functional History   Lives With Spouse;Son;Daughter   Type of Home House   Home Equipment None   ADL Assistance Independent   Homemaking Assistance Independent   Ambulation Assistance Independent   Transfer Assistance Independent   Discharge Planning   Type of Residence House   Living Arrangements Spouse/Significant Other;Children   Current Services Prior To Admission None   Potential Assistance Needed N/A   DME Ordered? No   Potential Assistance Purchasing Medications No   Patient expects to be discharged to: [facility] [place]   [phone]

## 2024-06-26 LAB
ANION GAP SERPL CALC-SCNC: 3 MMOL/L (ref 5–15)
BUN SERPL-MCNC: 8 MG/DL (ref 6–20)
BUN/CREAT SERPL: 8 (ref 12–20)
CALCIUM SERPL-MCNC: 7.6 MG/DL (ref 8.5–10.1)
CHLORIDE SERPL-SCNC: 117 MMOL/L (ref 97–108)
CO2 SERPL-SCNC: 16 MMOL/L (ref 21–32)
CREAT SERPL-MCNC: 0.95 MG/DL (ref 0.55–1.02)
ERYTHROCYTE [DISTWIDTH] IN BLOOD BY AUTOMATED COUNT: 13.4 % (ref 11.5–14.5)
GLUCOSE SERPL-MCNC: 83 MG/DL (ref 65–100)
HCT VFR BLD AUTO: 35.5 % (ref 35–47)
HGB BLD-MCNC: 11.4 G/DL (ref 11.5–16)
MCH RBC QN AUTO: 32.3 PG (ref 26–34)
MCHC RBC AUTO-ENTMCNC: 32.1 G/DL (ref 30–36.5)
MCV RBC AUTO: 100.6 FL (ref 80–99)
NRBC # BLD: 0 K/UL (ref 0–0.01)
NRBC BLD-RTO: 0 PER 100 WBC
PLATELET # BLD AUTO: 204 K/UL (ref 150–400)
PMV BLD AUTO: 9.6 FL (ref 8.9–12.9)
POTASSIUM SERPL-SCNC: 4.4 MMOL/L (ref 3.5–5.1)
RBC # BLD AUTO: 3.53 M/UL (ref 3.8–5.2)
SODIUM SERPL-SCNC: 136 MMOL/L (ref 136–145)
WBC # BLD AUTO: 13 K/UL (ref 3.6–11)

## 2024-06-26 PROCEDURE — 6370000000 HC RX 637 (ALT 250 FOR IP): Performed by: STUDENT IN AN ORGANIZED HEALTH CARE EDUCATION/TRAINING PROGRAM

## 2024-06-26 PROCEDURE — 36415 COLL VENOUS BLD VENIPUNCTURE: CPT

## 2024-06-26 PROCEDURE — 6360000002 HC RX W HCPCS: Performed by: STUDENT IN AN ORGANIZED HEALTH CARE EDUCATION/TRAINING PROGRAM

## 2024-06-26 PROCEDURE — 2580000003 HC RX 258: Performed by: STUDENT IN AN ORGANIZED HEALTH CARE EDUCATION/TRAINING PROGRAM

## 2024-06-26 PROCEDURE — 1200000000 HC SEMI PRIVATE

## 2024-06-26 PROCEDURE — 85027 COMPLETE CBC AUTOMATED: CPT

## 2024-06-26 PROCEDURE — 80048 BASIC METABOLIC PNL TOTAL CA: CPT

## 2024-06-26 RX ADMIN — SODIUM CHLORIDE: 9 INJECTION, SOLUTION INTRAVENOUS at 11:06

## 2024-06-26 RX ADMIN — OXYCODONE HYDROCHLORIDE 5 MG: 5 TABLET ORAL at 23:15

## 2024-06-26 RX ADMIN — SODIUM CHLORIDE: 9 INJECTION, SOLUTION INTRAVENOUS at 01:59

## 2024-06-26 RX ADMIN — KETOROLAC TROMETHAMINE 15 MG: 30 INJECTION, SOLUTION INTRAMUSCULAR at 23:15

## 2024-06-26 RX ADMIN — WATER 1000 MG: 1 INJECTION INTRAMUSCULAR; INTRAVENOUS; SUBCUTANEOUS at 23:15

## 2024-06-26 RX ADMIN — HYDROMORPHONE HYDROCHLORIDE 1 MG: 1 INJECTION, SOLUTION INTRAMUSCULAR; INTRAVENOUS; SUBCUTANEOUS at 18:30

## 2024-06-26 RX ADMIN — SODIUM CHLORIDE: 9 INJECTION, SOLUTION INTRAVENOUS at 23:15

## 2024-06-26 RX ADMIN — TAMSULOSIN HYDROCHLORIDE 0.4 MG: 0.4 CAPSULE ORAL at 09:35

## 2024-06-26 RX ADMIN — SODIUM CHLORIDE, PRESERVATIVE FREE 10 ML: 5 INJECTION INTRAVENOUS at 09:43

## 2024-06-26 ASSESSMENT — PAIN DESCRIPTION - ORIENTATION
ORIENTATION: LEFT
ORIENTATION: LEFT

## 2024-06-26 ASSESSMENT — PAIN DESCRIPTION - DESCRIPTORS
DESCRIPTORS: ACHING
DESCRIPTORS: ACHING

## 2024-06-26 ASSESSMENT — PAIN SCALES - GENERAL
PAINLEVEL_OUTOF10: 6
PAINLEVEL_OUTOF10: 0
PAINLEVEL_OUTOF10: 0
PAINLEVEL_OUTOF10: 8

## 2024-06-26 ASSESSMENT — PAIN DESCRIPTION - LOCATION
LOCATION: FLANK
LOCATION: FLANK;ABDOMEN

## 2024-06-26 NOTE — PROGRESS NOTES
Spiritual Care Assessment/Progress Note  [facility]    Name: Candace Roberts MRN: [id number]    Age: 48 y.o.     Sex: female   Language: English     Date: 6/26/2024            Total Time Calculated: 5 min              Spiritual Assessment begun in [facility] 5E1 SURGICAL UNIT  Service Provided For: Patient  Referral/Consult From: Clergy/  Encounter Overview/Reason: Rituals, Rites and Sacraments    Spiritual beliefs:      [x] Involved in a marco antonio tradition/spiritual practice: Gnosticism     [x] Supported by a marco antonio community: Rouse     [] Claims no spiritual orientation:      [] Seeking spiritual identity:           [] Adheres to an individual form of spirituality:      [] Not able to assess:                Identified resources for coping and support system:   Support System: Spouse       [x] Prayer                  [] Devotional reading               [x] Music                  [] Guided Imagery     [] Pet visits                                        [] Other: (COMMENT)     Specific area/focus of visit   Encounter:    Crisis:    Spiritual/Emotional needs: Type: Spiritual Support  Ritual, Rites and Sacraments: Type: Gnosticism Communion  Grief, Loss, and Adjustments:    Ethics/Mediation:    Behavioral Health:    Palliative Care:    Advance Care Planning: Type: ACP conversation, Care Preferences Addressed    Plan/Referrals: Continue to visit, (comment)    Narrative: Mrs. Roberts was sitting up in bed.  She had earplugs in because her roommate spoke loud because of hearing difficulty.  Mrs. Roberts is hoping to go home tomorrow. Prayer and communion offered. She expressed gratitude for this opportunity.     Sr. LEIGH Armas, RN, ACSW, LCSW   Page:  [phone]

## 2024-06-26 NOTE — PROGRESS NOTES
Patient: Candace Roberts MRN: [id number]  SSN: [id number]    YOB: 1975  Age: 48 y.o.  Sex: female        ADMITTED: 2024 to Jose L Casillas MD by Joan Abel MD for Dehydration [E86.0]  Hypokalemia [E87.6]  Intractable abdominal pain [R10.9]  Ureteral stone with hydronephrosis [N13.2]  Left ureteral stone [N20.1]  Leukocytosis, unspecified type [D72.829]  POD# * No surgery date entered * Procedure(s):  CYSTOSCOPY, LEFT RETROGRADE, LEFT URETEROSCOPY WITH LASER, AND LEFT STENT PLACEMENT    Candace Roberts is doing fair. Pain currently controlled, however, she has continued to have intermittent episodes of severe pain overnight. Denies passage of the stone.     Temp 99.5. VSS  WBC 13.0, contaminated urine sample  Cr 0.95 (1.27)      Vitals: Temp (24hrs), Av.9 °F (37.2 °C), Min:98.2 °F (36.8 °C), Max:99.5 °F (37.5 °C)    Blood pressure 110/74, pulse 73, temperature 99.5 °F (37.5 °C), temperature source Oral, resp. rate 18, height 1.676 m (5' 6\"), weight 63.5 kg (140 lb), last menstrual period 2024, SpO2 97 %.    Intake and Output:   1901 -  0700  In: -   Out: 1350 [Urine:1350]  No intake/output data recorded.  YADIRA Output lats 24 hrs: No data found.   YADIRA Output last 8 hrs: No data found.  BM over last 24 hrs: No data found.    Physical Exam  General: NAD, pleasant  Respiratory: no distress, breathing easily, room air  Abdomen: soft, no distention; LLQ tender to palpation  : no CVA tenderness, voiding independently   Neuro: Appropriate, no focal neurological deficits  Skin: warm, dry  Extremities: moves all, full ROM    Labs:  CBC:   Lab Results   Component Value Date/Time    WBC 13.0 2024 02:15 AM    HCT 35.5 2024 02:15 AM     2024 02:15 AM     BMP:   Lab Results   Component Value Date/Time     2024 02:15 AM    K 4.4 2024 02:15 AM     2024 02:15 AM    CO2 16 2024 02:15 AM    BUN 8 2024 02:15 AM       Assessment/Plan:   47 yo F with 5 mm distal left ureteral stone, mild obstruction     - Patient prefers to proceed with surgical intervention due to ongoing pain which is reasonable. Posted for cysto, left ureteroscopy with laser lithotripsy, left ureteral stent tomorrow at 3:30 PM with Dr. Pedroza, next available.  Discussed possibility of infected urine behind stone and placement of only stent if this is the case. Risks and benefits reviewed and she agrees and wishes to proceed. NPO at midnight. In the interim continue flomax, hydrate, strain all urine, empiric abx.     Signed By: KOJO Wilder - NP - June 26, 2024

## 2024-06-26 NOTE — PROGRESS NOTES
below:          General : alert x 3, awake, no acute distress,   HEENT: EOMI, moist mucus membrane  Neck: supple, no JVD,  Chest: Clear to auscultation bilaterally, RA  CVS: RRR, S1 S2 heard,  Abd: soft/ + right sided tenderness, non distended, BS physiological,   Ext: no clubbing, no cyanosis, no edema, brisk 2+ DP pulses  Neuro/Psych: pleasant mood and affect, THORPE spontaneously  Skin: warm     Data Review:    Review and/or order of clinical lab test  Review and/or order of tests in the radiology section of CPT  Review and/or order of tests in the medicine section of CPT      I have personally and independently reviewed all pertinent labs, diagnostic studies, imaging, and have provided independent interpretation of the same.     Labs:     Recent Labs     06/25/24  0506 06/26/24  0215   WBC 13.6* 13.0*   HGB 12.5 11.4*   HCT 37.1 35.5    204       Recent Labs     06/24/24  1350 06/25/24  0506 06/26/24  0215    138 136   K 2.8* 4.6 4.4    113* 117*   CO2 20* 23 16*   BUN 14 14 8   MG 1.6  --   --        Recent Labs     06/24/24  1350   ALT 19   GLOB 3.5       No results for input(s): \"INR\", \"APTT\" in the last 72 hours.    Invalid input(s): \"PTP\"   No results for input(s): \"TIBC\" in the last 72 hours.    Invalid input(s): \"FE\", \"PSAT\", \"FERR\"   No results found for: \"RBCF\"   No results for input(s): \"PH\", \"PCO2\", \"PO2\" in the last 72 hours.  No results for input(s): \"CPK\" in the last 72 hours.    Invalid input(s): \"CPKMB\", \"CKNDX\", \"TROIQ\"  No results found for: \"CHOL\", \"CHLST\", \"CHOLV\", \"HDL\", \"HDLC\", \"LDL\"  No results found for: \"GLUCPOC\"  [unfilled]    Notes reviewed from all clinical/nonclinical/nursing services involved in patient's clinical care. Care coordination discussions were held with appropriate clinical/nonclinical/ nursing providers based on care coordination needs.         Patients current active Medications were reviewed, considered, added and adjusted based on the clinical condition  today.      Home Medications were reconciled to the best of my ability given all available resources at the time of admission. Route is PO if not otherwise noted.      Admission Status:[id number]:::1}      Medications Reviewed:     Current Facility-Administered Medications   Medication Dose Route Frequency    HYDROmorphone HCl PF (DILAUDID) injection 1 mg  1 mg IntraVENous Q3H PRN    tamsulosin (FLOMAX) capsule 0.4 mg  0.4 mg Oral Daily    cefTRIAXone (ROCEPHIN) 1,000 mg in sterile water 10 mL IV syringe  1,000 mg IntraVENous Q24H    ketorolac (TORADOL) injection 15 mg  15 mg IntraVENous Q8H PRN    oxyCODONE (ROXICODONE) immediate release tablet 5 mg  5 mg Oral Q4H PRN    morphine (PF) injection 2 mg  2 mg IntraVENous Q3H PRN    naloxone (NARCAN) injection 0.4 mg  0.4 mg IntraVENous PRN    sodium chloride flush 0.9 % injection 5-40 mL  5-40 mL IntraVENous 2 times per day    sodium chloride flush 0.9 % injection 5-40 mL  5-40 mL IntraVENous PRN    0.9 % sodium chloride infusion   IntraVENous PRN    ondansetron (ZOFRAN-ODT) disintegrating tablet 4 mg  4 mg Oral Q8H PRN    Or    ondansetron (ZOFRAN) injection 4 mg  4 mg IntraVENous Q6H PRN    polyethylene glycol (GLYCOLAX) packet 17 g  17 g Oral Daily PRN    acetaminophen (TYLENOL) tablet 650 mg  650 mg Oral Q6H PRN    Or    acetaminophen (TYLENOL) suppository 650 mg  650 mg Rectal Q6H PRN    0.9 % sodium chloride infusion   IntraVENous Continuous     ______________________________________________________________________  EXPECTED LENGTH OF STAY: 3  ACTUAL LENGTH OF STAY:          2                 KOJO Garcia NP

## 2024-06-27 ENCOUNTER — APPOINTMENT (OUTPATIENT)
Facility: HOSPITAL | Age: 49
End: 2024-06-27
Payer: COMMERCIAL

## 2024-06-27 ENCOUNTER — ANESTHESIA (OUTPATIENT)
Facility: HOSPITAL | Age: 49
DRG: 661 | End: 2024-06-27
Payer: COMMERCIAL

## 2024-06-27 ENCOUNTER — ANESTHESIA EVENT (OUTPATIENT)
Facility: HOSPITAL | Age: 49
DRG: 661 | End: 2024-06-27
Payer: COMMERCIAL

## 2024-06-27 LAB
ANION GAP SERPL CALC-SCNC: 0 MMOL/L (ref 5–15)
BASOPHILS # BLD: 0.1 K/UL (ref 0–0.1)
BASOPHILS NFR BLD: 1 % (ref 0–1)
BUN SERPL-MCNC: 5 MG/DL (ref 6–20)
BUN/CREAT SERPL: 8 (ref 12–20)
CALCIUM SERPL-MCNC: 7.6 MG/DL (ref 8.5–10.1)
CHLORIDE SERPL-SCNC: 116 MMOL/L (ref 97–108)
CO2 SERPL-SCNC: 24 MMOL/L (ref 21–32)
CREAT SERPL-MCNC: 0.61 MG/DL (ref 0.55–1.02)
DIFFERENTIAL METHOD BLD: ABNORMAL
EOSINOPHIL # BLD: 0.3 K/UL (ref 0–0.4)
EOSINOPHIL NFR BLD: 3 % (ref 0–7)
ERYTHROCYTE [DISTWIDTH] IN BLOOD BY AUTOMATED COUNT: 13.1 % (ref 11.5–14.5)
GLUCOSE SERPL-MCNC: 88 MG/DL (ref 65–100)
HCT VFR BLD AUTO: 30.4 % (ref 35–47)
HGB BLD-MCNC: 10.5 G/DL (ref 11.5–16)
IMM GRANULOCYTES # BLD AUTO: 0 K/UL (ref 0–0.04)
IMM GRANULOCYTES NFR BLD AUTO: 0 % (ref 0–0.5)
LYMPHOCYTES # BLD: 1.5 K/UL (ref 0.8–3.5)
LYMPHOCYTES NFR BLD: 17 % (ref 12–49)
MCH RBC QN AUTO: 32.6 PG (ref 26–34)
MCHC RBC AUTO-ENTMCNC: 34.5 G/DL (ref 30–36.5)
MCV RBC AUTO: 94.4 FL (ref 80–99)
MONOCYTES # BLD: 0.7 K/UL (ref 0–1)
MONOCYTES NFR BLD: 8 % (ref 5–13)
NEUTS SEG # BLD: 6.4 K/UL (ref 1.8–8)
NEUTS SEG NFR BLD: 72 % (ref 32–75)
NRBC # BLD: 0 K/UL (ref 0–0.01)
NRBC BLD-RTO: 0 PER 100 WBC
PLATELET # BLD AUTO: 203 K/UL (ref 150–400)
PMV BLD AUTO: 9.7 FL (ref 8.9–12.9)
POTASSIUM SERPL-SCNC: 3.8 MMOL/L (ref 3.5–5.1)
RBC # BLD AUTO: 3.22 M/UL (ref 3.8–5.2)
SODIUM SERPL-SCNC: 140 MMOL/L (ref 136–145)
WBC # BLD AUTO: 8.9 K/UL (ref 3.6–11)

## 2024-06-27 PROCEDURE — 3700000000 HC ANESTHESIA ATTENDED CARE: Performed by: STUDENT IN AN ORGANIZED HEALTH CARE EDUCATION/TRAINING PROGRAM

## 2024-06-27 PROCEDURE — 0T748DZ DILATION OF LEFT KIDNEY PELVIS WITH INTRALUMINAL DEVICE, VIA NATURAL OR ARTIFICIAL OPENING ENDOSCOPIC: ICD-10-PCS | Performed by: STUDENT IN AN ORGANIZED HEALTH CARE EDUCATION/TRAINING PROGRAM

## 2024-06-27 PROCEDURE — 36415 COLL VENOUS BLD VENIPUNCTURE: CPT

## 2024-06-27 PROCEDURE — 6360000004 HC RX CONTRAST MEDICATION: Performed by: STUDENT IN AN ORGANIZED HEALTH CARE EDUCATION/TRAINING PROGRAM

## 2024-06-27 PROCEDURE — 2500000003 HC RX 250 WO HCPCS: Performed by: NURSE ANESTHETIST, CERTIFIED REGISTERED

## 2024-06-27 PROCEDURE — 1200000000 HC SEMI PRIVATE

## 2024-06-27 PROCEDURE — 85025 COMPLETE CBC W/AUTO DIFF WBC: CPT

## 2024-06-27 PROCEDURE — 2580000003 HC RX 258: Performed by: STUDENT IN AN ORGANIZED HEALTH CARE EDUCATION/TRAINING PROGRAM

## 2024-06-27 PROCEDURE — 6360000002 HC RX W HCPCS: Performed by: NURSE ANESTHETIST, CERTIFIED REGISTERED

## 2024-06-27 PROCEDURE — C1758 CATHETER, URETERAL: HCPCS | Performed by: STUDENT IN AN ORGANIZED HEALTH CARE EDUCATION/TRAINING PROGRAM

## 2024-06-27 PROCEDURE — C2617 STENT, NON-COR, TEM W/O DEL: HCPCS | Performed by: STUDENT IN AN ORGANIZED HEALTH CARE EDUCATION/TRAINING PROGRAM

## 2024-06-27 PROCEDURE — 7100000000 HC PACU RECOVERY - FIRST 15 MIN: Performed by: STUDENT IN AN ORGANIZED HEALTH CARE EDUCATION/TRAINING PROGRAM

## 2024-06-27 PROCEDURE — 0TF48ZZ FRAGMENTATION IN LEFT KIDNEY PELVIS, VIA NATURAL OR ARTIFICIAL OPENING ENDOSCOPIC: ICD-10-PCS | Performed by: STUDENT IN AN ORGANIZED HEALTH CARE EDUCATION/TRAINING PROGRAM

## 2024-06-27 PROCEDURE — 3700000001 HC ADD 15 MINUTES (ANESTHESIA): Performed by: STUDENT IN AN ORGANIZED HEALTH CARE EDUCATION/TRAINING PROGRAM

## 2024-06-27 PROCEDURE — C1769 GUIDE WIRE: HCPCS | Performed by: STUDENT IN AN ORGANIZED HEALTH CARE EDUCATION/TRAINING PROGRAM

## 2024-06-27 PROCEDURE — BT1F1ZZ FLUOROSCOPY OF LEFT KIDNEY, URETER AND BLADDER USING LOW OSMOLAR CONTRAST: ICD-10-PCS | Performed by: STUDENT IN AN ORGANIZED HEALTH CARE EDUCATION/TRAINING PROGRAM

## 2024-06-27 PROCEDURE — 6360000002 HC RX W HCPCS: Performed by: STUDENT IN AN ORGANIZED HEALTH CARE EDUCATION/TRAINING PROGRAM

## 2024-06-27 PROCEDURE — 6370000000 HC RX 637 (ALT 250 FOR IP): Performed by: STUDENT IN AN ORGANIZED HEALTH CARE EDUCATION/TRAINING PROGRAM

## 2024-06-27 PROCEDURE — 2580000003 HC RX 258: Performed by: NURSE ANESTHETIST, CERTIFIED REGISTERED

## 2024-06-27 PROCEDURE — 2720000010 HC SURG SUPPLY STERILE: Performed by: STUDENT IN AN ORGANIZED HEALTH CARE EDUCATION/TRAINING PROGRAM

## 2024-06-27 PROCEDURE — 3600000004 HC SURGERY LEVEL 4 BASE: Performed by: STUDENT IN AN ORGANIZED HEALTH CARE EDUCATION/TRAINING PROGRAM

## 2024-06-27 PROCEDURE — 3600000014 HC SURGERY LEVEL 4 ADDTL 15MIN: Performed by: STUDENT IN AN ORGANIZED HEALTH CARE EDUCATION/TRAINING PROGRAM

## 2024-06-27 PROCEDURE — 80048 BASIC METABOLIC PNL TOTAL CA: CPT

## 2024-06-27 PROCEDURE — 2709999900 HC NON-CHARGEABLE SUPPLY: Performed by: STUDENT IN AN ORGANIZED HEALTH CARE EDUCATION/TRAINING PROGRAM

## 2024-06-27 PROCEDURE — 7100000001 HC PACU RECOVERY - ADDTL 15 MIN: Performed by: STUDENT IN AN ORGANIZED HEALTH CARE EDUCATION/TRAINING PROGRAM

## 2024-06-27 DEVICE — URETERAL STENT
Type: IMPLANTABLE DEVICE | Site: URETER | Status: FUNCTIONAL
Brand: CONTOUR™

## 2024-06-27 RX ORDER — LIDOCAINE HYDROCHLORIDE 20 MG/ML
INJECTION, SOLUTION EPIDURAL; INFILTRATION; INTRACAUDAL; PERINEURAL PRN
Status: DISCONTINUED | OUTPATIENT
Start: 2024-06-27 | End: 2024-06-27 | Stop reason: SDUPTHER

## 2024-06-27 RX ORDER — CEFAZOLIN SODIUM 1 G/3ML
INJECTION, POWDER, FOR SOLUTION INTRAMUSCULAR; INTRAVENOUS PRN
Status: DISCONTINUED | OUTPATIENT
Start: 2024-06-27 | End: 2024-06-27 | Stop reason: SDUPTHER

## 2024-06-27 RX ORDER — ROCURONIUM BROMIDE 10 MG/ML
INJECTION, SOLUTION INTRAVENOUS PRN
Status: DISCONTINUED | OUTPATIENT
Start: 2024-06-27 | End: 2024-06-27 | Stop reason: SDUPTHER

## 2024-06-27 RX ORDER — ONDANSETRON 2 MG/ML
INJECTION INTRAMUSCULAR; INTRAVENOUS PRN
Status: DISCONTINUED | OUTPATIENT
Start: 2024-06-27 | End: 2024-06-27 | Stop reason: SDUPTHER

## 2024-06-27 RX ORDER — SODIUM CHLORIDE, SODIUM LACTATE, POTASSIUM CHLORIDE, CALCIUM CHLORIDE 600; 310; 30; 20 MG/100ML; MG/100ML; MG/100ML; MG/100ML
INJECTION, SOLUTION INTRAVENOUS CONTINUOUS PRN
Status: DISCONTINUED | OUTPATIENT
Start: 2024-06-27 | End: 2024-06-27 | Stop reason: SDUPTHER

## 2024-06-27 RX ORDER — MIDAZOLAM HYDROCHLORIDE 1 MG/ML
INJECTION INTRAMUSCULAR; INTRAVENOUS PRN
Status: DISCONTINUED | OUTPATIENT
Start: 2024-06-27 | End: 2024-06-27 | Stop reason: SDUPTHER

## 2024-06-27 RX ORDER — KETOROLAC TROMETHAMINE 30 MG/ML
INJECTION, SOLUTION INTRAMUSCULAR; INTRAVENOUS PRN
Status: DISCONTINUED | OUTPATIENT
Start: 2024-06-27 | End: 2024-06-27 | Stop reason: SDUPTHER

## 2024-06-27 RX ORDER — DEXAMETHASONE SODIUM PHOSPHATE 4 MG/ML
INJECTION, SOLUTION INTRA-ARTICULAR; INTRALESIONAL; INTRAMUSCULAR; INTRAVENOUS; SOFT TISSUE PRN
Status: DISCONTINUED | OUTPATIENT
Start: 2024-06-27 | End: 2024-06-27 | Stop reason: SDUPTHER

## 2024-06-27 RX ORDER — FENTANYL CITRATE 50 UG/ML
INJECTION, SOLUTION INTRAMUSCULAR; INTRAVENOUS PRN
Status: DISCONTINUED | OUTPATIENT
Start: 2024-06-27 | End: 2024-06-27 | Stop reason: SDUPTHER

## 2024-06-27 RX ADMIN — FENTANYL CITRATE 100 MCG: 50 INJECTION, SOLUTION INTRAMUSCULAR; INTRAVENOUS at 15:31

## 2024-06-27 RX ADMIN — KETOROLAC TROMETHAMINE 30 MG: 30 INJECTION, SOLUTION INTRAMUSCULAR; INTRAVENOUS at 16:09

## 2024-06-27 RX ADMIN — WATER 1000 MG: 1 INJECTION INTRAMUSCULAR; INTRAVENOUS; SUBCUTANEOUS at 23:04

## 2024-06-27 RX ADMIN — SODIUM CHLORIDE, PRESERVATIVE FREE 10 ML: 5 INJECTION INTRAVENOUS at 23:04

## 2024-06-27 RX ADMIN — POLYETHYLENE GLYCOL 3350 17 G: 17 POWDER, FOR SOLUTION ORAL at 23:09

## 2024-06-27 RX ADMIN — SUGAMMADEX 200 MG: 100 INJECTION, SOLUTION INTRAVENOUS at 16:08

## 2024-06-27 RX ADMIN — HYDROMORPHONE HYDROCHLORIDE 1 MG: 1 INJECTION, SOLUTION INTRAMUSCULAR; INTRAVENOUS; SUBCUTANEOUS at 15:45

## 2024-06-27 RX ADMIN — SODIUM CHLORIDE, PRESERVATIVE FREE 10 ML: 5 INJECTION INTRAVENOUS at 09:10

## 2024-06-27 RX ADMIN — ROCURONIUM BROMIDE 30 MG: 10 SOLUTION INTRAVENOUS at 15:31

## 2024-06-27 RX ADMIN — DEXAMETHASONE SODIUM PHOSPHATE 8 MG: 4 INJECTION, SOLUTION INTRAMUSCULAR; INTRAVENOUS at 15:40

## 2024-06-27 RX ADMIN — PROPOFOL 150 MG: 10 INJECTION, EMULSION INTRAVENOUS at 15:31

## 2024-06-27 RX ADMIN — CEFAZOLIN 2 G: 1 INJECTION, POWDER, FOR SOLUTION INTRAMUSCULAR; INTRAVENOUS at 15:44

## 2024-06-27 RX ADMIN — LIDOCAINE HYDROCHLORIDE 60 MG: 20 INJECTION, SOLUTION EPIDURAL; INFILTRATION; INTRACAUDAL; PERINEURAL at 15:31

## 2024-06-27 RX ADMIN — MIDAZOLAM 2 MG: 1 INJECTION INTRAMUSCULAR; INTRAVENOUS at 15:26

## 2024-06-27 RX ADMIN — ONDANSETRON 4 MG: 2 INJECTION INTRAMUSCULAR; INTRAVENOUS at 16:05

## 2024-06-27 RX ADMIN — TAMSULOSIN HYDROCHLORIDE 0.4 MG: 0.4 CAPSULE ORAL at 09:08

## 2024-06-27 RX ADMIN — SODIUM CHLORIDE, POTASSIUM CHLORIDE, SODIUM LACTATE AND CALCIUM CHLORIDE: 600; 310; 30; 20 INJECTION, SOLUTION INTRAVENOUS at 14:50

## 2024-06-27 RX ADMIN — SODIUM CHLORIDE 10 ML/HR: 9 INJECTION, SOLUTION INTRAVENOUS at 17:31

## 2024-06-27 ASSESSMENT — PAIN SCALES - GENERAL
PAINLEVEL_OUTOF10: 0

## 2024-06-27 NOTE — PERIOP NOTE
TRANSFER - OUT REPORT:    Verbal report given to ZULEIMA Drew on Candace Roberts  being transferred to [facility] for routine post-op       Report consisted of patient's Situation, Background, Assessment and   Recommendations(SBAR).     Information from the following report(s) Nurse Handoff Report, Adult Overview, Surgery Report, Intake/Output, MAR, and Recent Results was reviewed with the receiving nurse.           Lines:   Peripheral IV 06/26/24 Left Hand (Active)   Site Assessment Clean, dry & intact 06/27/24 1623   Line Status Infusing 06/27/24 1623   Line Care Connections checked and tightened 06/27/24 1623   Phlebitis Assessment No symptoms 06/27/24 1623   Infiltration Assessment 0 06/27/24 1623   Alcohol Cap Used Yes 06/27/24 1623   Dressing Status Clean, dry & intact 06/27/24 1623   Dressing Type Transparent 06/27/24 1623        Opportunity for questions and clarification was provided.

## 2024-06-27 NOTE — PROGRESS NOTES
Patient's consent form completed by patient and this RN. Consent form placed on top of patient's chart. Patient provided with CHG wipes and fresh gown for this AM.

## 2024-06-27 NOTE — PERIOP NOTE
TRANSFER - IN REPORT:    Verbal report received from Amy CHRISTIAN  being received from  for ordered procedure      Report consisted of patient's Situation, Background, Assessment and   Recommendations(SBAR).     Information from the following report(s) Surgery Report, Intake/Output, MAR, Recent Results, Pre Procedure Checklist, and Procedure Verification was reviewed with the receiving nurse.    Opportunity for questions and clarification was provided.      Assessment completed upon patient's arrival to unit and care assumed.

## 2024-06-27 NOTE — PROGRESS NOTES
[facility]                                                                                          Hospitalist Progress Note  Kathryn Jiménez PA-C  Office Phone: [phone]        Date of Service:  2024  NAME:  Candace Roberts  :  1975  MRN:  [id number]       Admission Summary:   Candace Roberts is a 48 y.o. female with past medical history of left lower extremity melanoma who presented to [facility] emergency department with complaints of nausea, vomiting left middle quadrant and flank pain.  Had been in her usual state of health until about 2 days ago when she experienced symptoms.  She admits to previous episode of kidney stone with 2 mm stone which she passed.  She reports marked improvement at time of my evaluation and denies any symptoms.  The patient denies any fever, chills, chest or abdominal pain, nausea, vomiting, cough, congestion, recent illness, palpitations, or dysuria.     Remarkable vitals on ER Presentation: vss  Labs Remarkable for: WBC 13.5, potassium 2.8  ER Images: CT abdomen pelvis: Mild left-sided hydronephrosis due to 5 mm stone distal left ureter 2. Bilateral nonobstructing renal stones   ER Rx: dilaudid, toradol, zofran, -40meq, 1l ns bolus    Interval history / Subjective:        No stone passage. Intermittent pain overnight.   OR today at 3:30 pm for lithotripsy and stent placement       Assessment & Plan:     Bilateral Nephrolithiasis  Mild left-sided hydronephrosis due to 5 mm stone distal left ureter   Bilateral nonobstructing renal stones   - renal fx stable  - start flomax, c/w empiric abx  - morphine toradol, darryl prn  - mivf  - urology evaluated  - strain all urine  - Lithotripsy /stent placement today      Leukocytosis -- resolved   - likely 2/2 above  - UA: poor sample with moderate epithelial cells  - rocephin , ucx not ordered on admission     Hypokalemia - resolved       Code status: Full  Prophylaxis:  SCD's  Care Plan discussed with: patient, nursing, attending  Anticipated Disposition: home when stable  Inpatient  Cardiac monitoring: Remote Telemetry  Central Line:            Social Determinants of Health     Tobacco Use: Unknown (6/27/2024)    Patient History     Smoking Tobacco Use: Never     Smokeless Tobacco Use: Unknown     Passive Exposure: Not on file   Alcohol Use: Not on file   Financial Resource Strain: Not on file   Food Insecurity: No Food Insecurity (6/24/2024)    Hunger Vital Sign     Worried About Running Out of Food in the Last Year: Never true     Ran Out of Food in the Last Year: Never true   Transportation Needs: No Transportation Needs (6/24/2024)    PRAPARE - Transportation     Lack of Transportation (Medical): No     Lack of Transportation (Non-Medical): No   Physical Activity: Not on file   Stress: Not on file   Social Connections: Not on file   Intimate Partner Violence: Not on file   Depression: Not on file   Housing Stability: Low Risk  (6/24/2024)    Housing Stability Vital Sign     Unable to Pay for Housing in the Last Year: No     Number of Places Lived in the Last Year: 1     Unstable Housing in the Last Year: No   Interpersonal Safety: Not At Risk (6/24/2024)    Interpersonal Safety Domain Source: IP Abuse Screening     Physical abuse: Denies     Verbal abuse: Denies     Emotional abuse: Denies     Financial abuse: Denies     Sexual abuse: Denies   Utilities: Not At Risk (6/24/2024)    [facility] Utilities     Threatened with loss of utilities: No       Review of Systems:   Pertinent items are noted in HPI.       Vital Signs:    Last 24hrs VS reviewed since prior progress note. Most recent are:  Vitals:    06/27/24 1444   BP: 137/86   Pulse: 67   Resp: 16   Temp:    SpO2: 98%         Intake/Output Summary (Last 24 hours) at 6/27/2024 1448  Last data filed at 6/27/2024 1324  Gross per 24 hour   Intake --   Output 1300 ml   Net -1300 ml          Physical Examination:     I had a face to

## 2024-06-27 NOTE — OP NOTE
DATE OF PROCEDURE: 6/27/2024    SURGEON: Salvatore Pedroza MD    ASSISTANT: none    PREOPERATIVE DIAGNOSES:   1.  Left ureteral calculus      POSTOPERATIVE DIAGNOSES:   1.  No ureteral calculi seen  2.  Left renal calculus    PROCEDURES PERFORMED:   1.  Cystourethroscopy  2.  Left ureteroscopic stone manipulation with laser lithotripsy  3.  Left retrograde pyelogram  4.  Left ureteral stent placement-6 x 24 double-J with long string  5.  Intraoperative fluoroscopy interpretation less than 1 hour    PERIOPERATIVE ANTIBIOTICS: Ancef    ANAESTHESIA: General    INDICATIONS:This patient has a history of obstructing 4 mm distal left ureteral calculus diagnosed on CT on 6/24.  She had refractory pain and elected to proceed with ureteroscopy.  I met her in the preoperative holding area, reviewed risk and benefits of the procedure and she elected to proceed.  She denied interval stone passage. After discussion of management options the patient elected to proceed with the procedures listed above.     PROCEDURE NARRATIVE: The patient signed consent for the operation prior to being brought back to the operating room. Upon arrival on the operating room, a time out was performed for the patient's safety and the correct patient, procedure, site and side were identified. The patient was placed in a supine position and anesthesia was administered.  The patient was placed in a dorsal lithotomy position. All pressure points were appropriately padded in order to prevent compartment syndrome and neuropathy. The patient was prepped and draped in the usual sterile fashion.     Cystourethroscopy was performed with a 21 Green[place]ic sheath and 30 degree lens.  The urethra appeared normal.  Bladder mucosa appeared normal with no masses or lesions seen.  No bladder stones visualized.  Ureteral orifices were orthotopic and effluxing clear urine.  Fluoroscopic images did not reveal any radiopaque calculi.  Sensor wire was advanced into the left  ureteral orifice and advanced to the level of the left renal pelvis, confirmed on fluoroscopy.  The wire was secured to the drape.  Semirigid scope was advanced alongside this to the level of proximal ureter.  No ureteral calculi were seen.  There was some trace edema in the distal ureter suspected from recent stone.  No urothelial lesions otherwise.  I backed the semirigid scope out, inspected the ureter once again, and again, no ureteral calculi were seen.    Over the wire, a flexible ureteroscope was advanced to the level of the renal pelvis.  All calyces were systematically surveyed.  In the upper pole lateral calyx, there was a small 2-3 mm stone which was somewhat adherent to the papilla.  I used the holmium laser fiber with dusting settings to fragment the stone into dust entirely.  No additional stones were seen.  Retrograde pyelogram performed through the scope demonstrated normal filling of the pelvicalyceal system without hydronephrosis.  This was used as a map to guide survey of the entire calyceal system and no additional stones were seen.  The scope was withdrawn and the wire was replaced.  There is no evidence of ureteral stone or injury.  Over the wire, a 6 x 24 double-J stent was placed with good proximal distal curl seen.  The bladder was drained.  The string was trimmed externally and tucked in the vagina.  This terminated the procedure.    INTRAOPERATIVE FINDINGS/ SYNOPSIS:   1.  No ureteral stone seen  2.  Small 2-3 mm left renal stone fragmented entirely with laser      BLOOD LOSS: Minimal    DRAINS: 6 x 24 double-J stent on left with long string    SPECIMENS:   None  COMPLICATIONS: None    DISPOSITION: The patient will be taken to the PACU for recovery.  She will return to her inpatient bed.  She is okay for discharge from a urology perspective.  She will need to follow-up Monday 7/1 for office visit and stent removal.

## 2024-06-27 NOTE — ANESTHESIA PRE PROCEDURE
Department of Anesthesiology  Preprocedure Note       Name:  Candace Roberts   Age:  48 y.o.  :  1975                                          MRN:  [id number]         Date:  2024      Surgeon: Surgeon(s):  Salvatore Pedroza MD    Procedure: Procedure(s):  CYSTOSCOPY, LEFT RETROGRADE, LEFT URETEROSCOPY WITH LASER, AND LEFT STENT PLACEMENT    Medications prior to admission:   Prior to Admission medications    Medication Sig Start Date End Date Taking? Authorizing Provider   SPIRONOLACTONE PO Take by mouth   Yes Provider, MD Tanna   acetaminophen-codeine (TYLENOL #3) 300-30 MG per tablet Take 2 tablets by mouth every 4 hours as needed. Max Daily Amount: 12 tablets 14   Automatic Reconciliation, Ar       Current medications:    Current Facility-Administered Medications   Medication Dose Route Frequency Provider Last Rate Last Admin   • tamsulosin (FLOMAX) capsule 0.4 mg  0.4 mg Oral Daily Mildred Mccormick MD   0.4 mg at 24 0908   • cefTRIAXone (ROCEPHIN) 1,000 mg in sterile water 10 mL IV syringe  1,000 mg IntraVENous Q24H Mildred Mccormick MD   1,000 mg at 24 2315   • ketorolac (TORADOL) injection 15 mg  15 mg IntraVENous Q8H PRN Mildred Mccormick MD   15 mg at 24 2315   • oxyCODONE (ROXICODONE) immediate release tablet 5 mg  5 mg Oral Q4H PRN Mildred Mccormick MD   5 mg at 24 2315   • morphine (PF) injection 2 mg  2 mg IntraVENous Q3H PRN Mildred Mccormick MD   2 mg at 24 1120   • naloxone (NARCAN) injection 0.4 mg  0.4 mg IntraVENous PRN Mildred Mccormick MD       • sodium chloride flush 0.9 % injection 5-40 mL  5-40 mL IntraVENous 2 times per day Mildred Mccormick MD   10 mL at 24 0910   • sodium chloride flush 0.9 % injection 5-40 mL  5-40 mL IntraVENous PRN Mildred Mccormick MD       • 0.9 % sodium chloride infusion   IntraVENous PRN Mildred Mccormick MD 75 mL/hr at 24 2315 New Bag at 24 2315   • ondansetron (ZOFRAN-ODT)  patient.      Plan discussed with CRNA.    Attending anesthesiologist reviewed and agrees with Preprocedure content            Erich Camargo MD   6/27/2024

## 2024-06-28 VITALS
SYSTOLIC BLOOD PRESSURE: 127 MMHG | HEIGHT: 66 IN | DIASTOLIC BLOOD PRESSURE: 76 MMHG | OXYGEN SATURATION: 100 % | HEART RATE: 78 BPM | TEMPERATURE: 98.1 F | WEIGHT: 140 LBS | BODY MASS INDEX: 22.5 KG/M2 | RESPIRATION RATE: 18 BRPM

## 2024-06-28 PROBLEM — N20.1 LEFT URETERAL STONE: Status: RESOLVED | Noted: 2024-06-24 | Resolved: 2024-06-28

## 2024-06-28 LAB
ANION GAP SERPL CALC-SCNC: 7 MMOL/L (ref 5–15)
BUN SERPL-MCNC: 6 MG/DL (ref 6–20)
BUN/CREAT SERPL: 9 (ref 12–20)
CALCIUM SERPL-MCNC: 8.5 MG/DL (ref 8.5–10.1)
CHLORIDE SERPL-SCNC: 111 MMOL/L (ref 97–108)
CO2 SERPL-SCNC: 23 MMOL/L (ref 21–32)
CREAT SERPL-MCNC: 0.64 MG/DL (ref 0.55–1.02)
GLUCOSE SERPL-MCNC: 98 MG/DL (ref 65–100)
POTASSIUM SERPL-SCNC: 4.2 MMOL/L (ref 3.5–5.1)
SODIUM SERPL-SCNC: 141 MMOL/L (ref 136–145)

## 2024-06-28 PROCEDURE — 2580000003 HC RX 258: Performed by: STUDENT IN AN ORGANIZED HEALTH CARE EDUCATION/TRAINING PROGRAM

## 2024-06-28 PROCEDURE — 80048 BASIC METABOLIC PNL TOTAL CA: CPT

## 2024-06-28 PROCEDURE — 36415 COLL VENOUS BLD VENIPUNCTURE: CPT

## 2024-06-28 PROCEDURE — 6370000000 HC RX 637 (ALT 250 FOR IP): Performed by: STUDENT IN AN ORGANIZED HEALTH CARE EDUCATION/TRAINING PROGRAM

## 2024-06-28 RX ORDER — TAMSULOSIN HYDROCHLORIDE 0.4 MG/1
0.4 CAPSULE ORAL DAILY
Qty: 4 CAPSULE | Refills: 0 | Status: SHIPPED | OUTPATIENT
Start: 2024-06-28 | End: 2024-07-02

## 2024-06-28 RX ORDER — OXYBUTYNIN CHLORIDE 5 MG/1
5 TABLET ORAL 3 TIMES DAILY PRN
Qty: 12 TABLET | Refills: 0 | Status: SHIPPED | OUTPATIENT
Start: 2024-06-28 | End: 2024-07-02

## 2024-06-28 RX ORDER — PHENAZOPYRIDINE HYDROCHLORIDE 100 MG/1
200 TABLET, FILM COATED ORAL
Status: DISCONTINUED | OUTPATIENT
Start: 2024-06-28 | End: 2024-06-28 | Stop reason: HOSPADM

## 2024-06-28 RX ORDER — PHENAZOPYRIDINE HYDROCHLORIDE 200 MG/1
200 TABLET, FILM COATED ORAL
Qty: 9 TABLET | Refills: 0 | Status: SHIPPED | OUTPATIENT
Start: 2024-06-28 | End: 2024-07-01

## 2024-06-28 RX ORDER — OXYBUTYNIN CHLORIDE 5 MG/1
5 TABLET ORAL 3 TIMES DAILY PRN
Status: DISCONTINUED | OUTPATIENT
Start: 2024-06-28 | End: 2024-06-28 | Stop reason: HOSPADM

## 2024-06-28 RX ORDER — NITROFURANTOIN 25; 75 MG/1; MG/1
100 CAPSULE ORAL 2 TIMES DAILY
Qty: 6 CAPSULE | Refills: 0 | Status: SHIPPED | OUTPATIENT
Start: 2024-06-28 | End: 2024-07-01

## 2024-06-28 RX ADMIN — TAMSULOSIN HYDROCHLORIDE 0.4 MG: 0.4 CAPSULE ORAL at 09:00

## 2024-06-28 RX ADMIN — SODIUM CHLORIDE, PRESERVATIVE FREE 10 ML: 5 INJECTION INTRAVENOUS at 09:00

## 2024-06-28 ASSESSMENT — PAIN SCALES - GENERAL: PAINLEVEL_OUTOF10: 0

## 2024-06-28 NOTE — DISCHARGE INSTRUCTIONS
Discharge Instructions       PATIENT ID: Candace Roberts  MRN: [id number]   YOB: 1975    DATE OF ADMISSION: 6/24/2024   DATE OF DISCHARGE: 6/28/2024    PRIMARY CARE PROVIDER: Twyla Nova     ATTENDING PHYSICIAN: Jose L Casillas MD   DISCHARGING PROVIDER: Kathryn Jiménez PA-C    To contact this individual call [phone] and ask the  to page.   If unavailable ask to be transferred the Adult Hospitalist Department.    DISCHARGE DIAGNOSES     Bilateral Nephrolithiasis  Mild left-sided hydronephrosis due to 5 mm stone distal left ureter   Bilateral nonobstructing renal stones   - renal fx stable  - flomax, received 4 days rocephin  - pain improved 6/27, pain resolved today 6/28  - mivf  - strain all urine  - Lithotripsy /stent placement 6/27  - Urology cleared for dc today     Leukocytosis -- resolved   - likely 2/2 above  - UA: poor sample with moderate epithelial cells  - received 4 days rocephin , ucx not ordered on admission  - lower suspicion of true UTI but will finish course of antibiotics outpatient       Hypokalemia - resolved      CONSULTATIONS: IP CONSULT TO UROLOGY  IP CONSULT TO HOSPITALIST  IP CONSULT TO SPIRITUAL SERVICES    PROCEDURES/SURGERIES: Procedure(s):  CYSTOSCOPY, LEFT RETROGRADE, LEFT URETEROSCOPY WITH LASER, AND LEFT STENT PLACEMENT    PENDING TEST RESULTS:   At the time of discharge the following test results are still pending: n/a    FOLLOW UP APPOINTMENTS:   @[facility]     ADDITIONAL CARE RECOMMENDATIONS:   Follow up with Urology 7/1 for stent removal   Pyridium PRN, note this may cause your urine to turn orange  Oxybutynin, Flomax for stent colic  Finish course of antibiotics      DIET: regular diet     ACTIVITY: activity as tolerated     WOUND CARE: n/a     EQUIPMENT needed: n/a      DISCHARGE MEDICATIONS:   See Medication Reconciliation Form    It is important that you take the medication exactly as they are prescribed.   Keep your medication in the  bottles provided by the pharmacist and keep a list of the medication names, dosages, and times to be taken in your wallet.   Do not take other medications without consulting your doctor.       NOTIFY YOUR PHYSICIAN FOR ANY OF THE FOLLOWING:   Fever over 101 degrees for 24 hours.   Chest pain, shortness of breath, fever, chills, nausea, vomiting, diarrhea, change in mentation, falling, weakness, bleeding. Severe pain or pain not relieved by medications.  Or, any other signs or symptoms that you may have questions about.      DISPOSITION:   x Home With:   OT  PT  HH  RN       SNF/Inpatient Rehab/LTAC    Independent/assisted living    Hospice    Other:     CDMP Checked:   Yes x     PROBLEM LIST Updated:  Yes x       Signed:   Kathryn Jiménez PA-C  6/28/2024  9:40 AM

## 2024-06-28 NOTE — PROGRESS NOTES
Progress Note    Patient: Candace Roberts MRN: [id number]  SSN: [id number]    YOB: 1975  Age: 48 y.o.  Sex: female          ADMITTED:  2024 to Jose L Casillas MD  for Dehydration [E86.0]  Hypokalemia [E87.6]  Intractable abdominal pain [R10.9]  Ureteral stone with hydronephrosis [N13.2]  Left ureteral stone [N20.1]  Leukocytosis, unspecified type [D72.829]           Candace Roberts is 1 Day Post-Op Procedure(s):  CYSTOSCOPY, LEFT RETROGRADE, LEFT URETEROSCOPY WITH LASER, AND LEFT STENT PLACEMENT.  She is doing well.   She has no pain. Reports dysuria and urinary frequency. Otherwise no complaints. Eager to go home.       Vitals:  Temp (24hrs), Av.7 °F (36.5 °C), Min:97.3 °F (36.3 °C), Max:98.1 °F (36.7 °C)     Blood pressure 127/76, pulse 61, temperature 98.1 °F (36.7 °C), temperature source Oral, resp. rate 18, height 1.676 m (5' 6\"), weight 63.5 kg (140 lb), last menstrual period 2024, SpO2 100 %.      I&O's:   1901 -  0700  In: 400 [I.V.:400]  Out: 600 [Urine:600]   No intake/output data recorded.     Exam:   Physical Exam  General: NAD, pleasant  Respiratory: no distress, breathing easily, room air  Abdomen: soft, no distention; non-tender to palpation  : no CVA tenderness, voiding independently   Neuro: Appropriate, no focal neurological deficits  Skin: warm, dry  Extremities: moves all, full ROM     Labs:   Recent Labs     24  0458   WBC 13.0* 8.9   HGB 11.4* 10.5*   HCT 35.5 30.4*    203     Recent Labs     24  0458 24  0512    140 141   K 4.4 3.8 4.2   * 116* 111*   CO2 16* 24 23   BUN 8 5* 6        Cultures:        Imaging:       Assessment:     - 1 Day Post-Op Procedure(s):  CYSTOSCOPY, LEFT RETROGRADE, LEFT URETEROSCOPY WITH LASER, AND LEFT STENT PLACEMENT    Principal Problem:    Left ureteral stone  Resolved Problems:    * No resolved hospital problems.

## 2024-06-28 NOTE — DISCHARGE SUMMARY
Discharge Summary       PATIENT ID: Candace Roberts  MRN: [id number]   YOB: 1975    DATE OF ADMISSION: 6/24/2024  1:10 PM    DATE OF DISCHARGE: 6/28/24   PRIMARY CARE PROVIDER: Twyla Nova MD     ATTENDING PHYSICIAN: DIMAS Casillas MD  DISCHARGING PROVIDER: Kathryn Jiménez PA-C    To contact this individual call [phone] and ask the  to page.  If unavailable ask to be transferred the Adult Hospitalist Department.    CONSULTATIONS: IP CONSULT TO UROLOGY  IP CONSULT TO HOSPITALIST  IP CONSULT TO SPIRITUAL SERVICES    PROCEDURES/SURGERIES: Procedure(s):  CYSTOSCOPY, LEFT RETROGRADE, LEFT URETEROSCOPY WITH LASER, AND LEFT STENT PLACEMENT     ADMITTING DIAGNOSES & HOSPITAL COURSE:     Candace Roberts is a 48 y.o. female with past medical history of left lower extremity melanoma who presented to [facility] emergency department with complaints of nausea, vomiting left middle quadrant and flank pain.  Had been in her usual state of health until about 2 days ago when she experienced symptoms.  She admits to previous episode of kidney stone with 2 mm stone which she passed.  She reports marked improvement at time of my evaluation and denies any symptoms.  The patient denies any fever, chills, chest or abdominal pain, nausea, vomiting, cough, congestion, recent illness, palpitations, or dysuria.     Remarkable vitals on ER Presentation: vss  Labs Remarkable for: WBC 13.5, potassium 2.8  ER Images: CT abdomen pelvis: Mild left-sided hydronephrosis due to 5 mm stone distal left ureter 2. Bilateral nonobstructing renal stones   ER Rx: dilaudid, toradol, zofran, -40meq, 1l ns bolus        DISCHARGE DIAGNOSES / PLAN:      Bilateral Nephrolithiasis  Mild left-sided hydronephrosis due to 5 mm stone distal left ureter   Bilateral nonobstructing renal stones   - renal fx stable  - flomax, c/w empiric abx  - pain improved 6/27, pain resolved today 6/28  - mivf  - strain all urine  - Lithotripsy /stent

## 2024-07-02 NOTE — PROGRESS NOTES
Physician Progress Note      PATIENT:               NANY AMADO  [facility] #:                  [id number]  :                       1975  ADMIT DATE:       2024 1:10 PM  DISCH DATE:        2024 5:48 PM  RESPONDING  PROVIDER #:        Kathryn Bartholomew PA-C          QUERY TEXT:    Pt admitted with Bilateral Nephrolithiasis. Pt noted to have decrease in SCr   to greater than or equal to 1.5 times baseline, with Cr 1.27 on  and   decreased to 0.61 on . If possible, please document in the progress notes   and discharge summary if you are evaluating and/or treating any of the   following:    The medical record reflects the following:  Risk Factors: 48 y.o. female with Bilateral Nephrolithiasis, left-sided   hydronephrosis, Left ureteral calculus  Clinical Indicators:  Lab results -  Cr: 1.27-0.95-0.61-0.64  BUN: 14-8-5-6  eGFR: 52-73->90->90  Treatment: Urology floor admission, Urology consult, Left ureteroscopic stone   manipulation with laser lithotripsy, Left ureteral stent placement, Chemistry   monitoring, 1L NS bolus IV, 0.9%NaCl continuous IV at 75ml/hr, Pyridium PO,   Flomax PO    Thank you,  JAILENE VasquezN, RN, CCRN, CRCR  [employer]ber: [phone]  I am also available via PS.    Defined by Kidney Disease Improving Global Outcomes (KDIGO) clinical practice   guideline for acute kidney injury:  -Increase in SCr by greater than or equal to 0.3 mg/dl within 48 hours; or  -Increase or decrease in SCr to greater than or equal to 1.5 times baseline,   which is known or presumed to have occurred within the prior 7 days; or  -Urine volume < 0.5ml/kg/h for 6 hours  Options provided:  -- Acute kidney failure  -- Acute kidney injury  -- Other - I will add my own diagnosis  -- Disagree - Not applicable / Not valid  -- Disagree - Clinically unable to determine / Unknown  -- Refer to Clinical Documentation Reviewer    PROVIDER RESPONSE TEXT:    This patient has an Acute kidney

## 2024-07-12 NOTE — ANESTHESIA POSTPROCEDURE EVALUATION
Department of Anesthesiology  Postprocedure Note    Patient: Candace Roberts  MRN: [id number]  YOB: 1975  Date of evaluation: 7/12/2024    Procedure Summary       Date: 06/27/24 Room / Location: [facility] MAIN OR [address] [place] [address] MAIN OR    Anesthesia Start: 1528 Anesthesia Stop: 1624    Procedure: CYSTOSCOPY, LEFT RETROGRADE, LEFT URETEROSCOPY WITH LASER, AND LEFT STENT PLACEMENT (Left: Ureter) Diagnosis:       Calculus of bladder      (Calculus of bladder [N21.0])    Providers: Salvatore Pedroza MD Responsible Provider: Teodoro Garcia MD    Anesthesia Type: General ASA Status: 2            Anesthesia Type: General    Mario Phase I: Mario Score: 10    Mario Phase II:      Anesthesia Post Evaluation    Patient location during evaluation: PACU  Patient participation: complete - patient participated  Level of consciousness: responsive to verbal stimuli and sleepy but conscious  Pain score: 2  Airway patency: patent  Cardiovascular status: blood pressure returned to baseline  Respiratory status: acceptable  Hydration status: stable  Comments: +Post-Anesthesia Evaluation and Assessment    Patient: Candace Roberts MRN: [id number]  SSN: [id number]   YOB: 1975  Age: 48 y.o.  Sex: female          Cardiovascular Function/Vital Signs    /76   Pulse 78   Temp 98.1 °F (36.7 °C) (Oral)   Resp 18   Ht 1.676 m (5' 6\")   Wt 63.5 kg (140 lb)   SpO2 100%   BMI 22.60 kg/m²     Patient is status post Procedure(s):  CYSTOSCOPY, LEFT RETROGRADE, LEFT URETEROSCOPY WITH LASER, AND LEFT STENT PLACEMENT.    Nausea/Vomiting: Controlled.    Postoperative hydration reviewed and adequate.    Pain:      Managed.    Neurological Status:       At baseline.    Mental Status and Level of Consciousness: Arousable.    Pulmonary Status:       Adequate oxygenation and airway patent.    Complications related to anesthesia: None    Post-anesthesia assessment completed. No concerns.    I have evaluated the patient and

## (undated) DEVICE — SOLUTION IRRIG 3000ML 0.9% SOD CHL USP UROMATIC PLAS CONT

## (undated) DEVICE — GARMENT,MEDLINE,DVT,INT,CALF,MED, GEN2: Brand: MEDLINE

## (undated) DEVICE — FLEXIVA PULSE AND FLEXIVA PULSE TRACTIP LASER FIBERS ARE HIGH POWER SINGLE-USE: Brand: FLEXIVA PULSE

## (undated) DEVICE — CYSTO-SMH: Brand: MEDLINE INDUSTRIES, INC.

## (undated) DEVICE — DRAINBAG,ANTI-REFLUX TOWER,L/F,2000ML,LL: Brand: MEDLINE

## (undated) DEVICE — GOWN,SIRUS,POLYRNF,BRTHSLV,XL,30/CS: Brand: MEDLINE

## (undated) DEVICE — SOLUTION IRRIGATION STRL H2O 1000 ML UROMATIC CONTAINER

## (undated) DEVICE — GUIDEWIRE ENDOSCP L150CM DIA0.035IN TIP 3CM PTFE NIT

## (undated) DEVICE — SYRINGE MED 10ML LUERLOCK TIP W/O SFTY DISP

## (undated) DEVICE — GLOVE ORANGE PI 8   MSG9080

## (undated) DEVICE — OPEN-END URETERAL CATHETER: Brand: COOK

## (undated) DEVICE — SOLUTION IRRIG 1000ML STRL H2O USP PLAS POUR BTL